# Patient Record
Sex: MALE | HISPANIC OR LATINO | Employment: FULL TIME | URBAN - METROPOLITAN AREA
[De-identification: names, ages, dates, MRNs, and addresses within clinical notes are randomized per-mention and may not be internally consistent; named-entity substitution may affect disease eponyms.]

---

## 2017-01-19 ENCOUNTER — THERAPY VISIT (OUTPATIENT)
Dept: PHYSICAL THERAPY | Facility: CLINIC | Age: 32
End: 2017-01-19
Payer: OTHER MISCELLANEOUS

## 2017-01-19 DIAGNOSIS — M25.572 CHRONIC PAIN OF LEFT ANKLE: Primary | ICD-10-CM

## 2017-01-19 DIAGNOSIS — G89.29 CHRONIC PAIN OF LEFT ANKLE: Primary | ICD-10-CM

## 2017-01-19 PROCEDURE — 97110 THERAPEUTIC EXERCISES: CPT | Mod: GP | Performed by: PHYSICAL THERAPIST

## 2017-01-19 PROCEDURE — 97530 THERAPEUTIC ACTIVITIES: CPT | Mod: GP | Performed by: PHYSICAL THERAPIST

## 2017-01-19 NOTE — PROGRESS NOTES
Subjective:    HPI                    Objective:    System    Physical Exam    General     ROS    Assessment/Plan:      PROGRESS  REPORT    Progress reporting period is from 10-20-16 to 1-19-17.       SUBJECTIVE  Subjective changes noted by patient:  Pt returns one month after his last appt for re-check. Pt reported absence of left ankle pain for the majority of that time, however, noted medial left ankle pain for 2 days this past week. Pt reported waking with the pain. Ankle pain lasted for 2 days, decreasing in intensity over the 2 days. Pt now reports absence of sx's. Pt unaware as to what might have provoked temporary sx's.    Current pain level is: 0/10.     Previous pain level was  0/10 (at last visit on 12-19-16).  Changes in function:  Yes (See Goal flowsheet attached for changes in current functional level)  Adverse reaction to treatment or activity: None    OBJECTIVE  Objective: AROM: DF-5-6 degrees left; 9-10 degrees right. Unable to perform a single leg toe raise on the left without UE support; with light support able to perform 8 reps on the left prior to onset of anterior ankle pain/weakness. Able to perform upwards of 30 eccentric calf raises on the left. Calf circumference-15 3/4 inches on the left (6 inches below joint line) and 15 7/8 inches on the right.     ASSESSMENT/PLAN  Updated problem list and treatment plan: Diagnosis 1:  Left ankle pain  Pain -  manual therapy, self management, education and home program  Decreased ROM/flexibility - manual therapy and therapeutic exercise  Decreased strength - therapeutic exercise and therapeutic activities  Decreased function - therapeutic activities  STG/LTGs have been met or progress has been made towards goals:  Yes (See Goal flow sheet completed today.)  Assessment of Progress: Patient is meeting short term goals and is progressing towards long term goals.  Self Management Plans:  Patient has been instructed in a home treatment program.  I have  re-evaluated this patient and find that the nature, scope, duration and intensity of the therapy is appropriate for the medical condition of the patient.  Adan continues to require the following intervention to meet STG and LTG's:  Pt has completed 8 visits as approved by [employer]. Pt reported he received a call from  asking him to schedule a follow-up with referring provider.    Recommendations:  No further PT is currently planned. Pt will return for follow-up with referring provider.    Please refer to the daily flowsheet for treatment today, total treatment time and time spent performing 1:1 timed codes.

## 2017-04-20 ENCOUNTER — OFFICE VISIT (OUTPATIENT)
Dept: FAMILY MEDICINE | Facility: CLINIC | Age: 32
End: 2017-04-20
Payer: COMMERCIAL

## 2017-04-20 VITALS
DIASTOLIC BLOOD PRESSURE: 83 MMHG | OXYGEN SATURATION: 97 % | BODY MASS INDEX: 32.2 KG/M2 | SYSTOLIC BLOOD PRESSURE: 134 MMHG | WEIGHT: 230 LBS | HEIGHT: 71 IN | HEART RATE: 63 BPM | TEMPERATURE: 98.4 F

## 2017-04-20 DIAGNOSIS — N45.1 EPIDIDYMITIS, RIGHT: Primary | ICD-10-CM

## 2017-04-20 LAB
ALBUMIN UR-MCNC: 30 MG/DL
APPEARANCE UR: CLEAR
BACTERIA #/AREA URNS HPF: ABNORMAL /HPF
BILIRUB UR QL STRIP: NEGATIVE
COLOR UR AUTO: YELLOW
GLUCOSE UR STRIP-MCNC: NEGATIVE MG/DL
HGB UR QL STRIP: NEGATIVE
KETONES UR STRIP-MCNC: ABNORMAL MG/DL
LEUKOCYTE ESTERASE UR QL STRIP: NEGATIVE
NITRATE UR QL: NEGATIVE
PH UR STRIP: 8.5 PH (ref 5–7)
RBC #/AREA URNS AUTO: ABNORMAL /HPF (ref 0–2)
SP GR UR STRIP: 1.02 (ref 1–1.03)
URN SPEC COLLECT METH UR: ABNORMAL
UROBILINOGEN UR STRIP-ACNC: 1 EU/DL (ref 0.2–1)
WBC #/AREA URNS AUTO: ABNORMAL /HPF (ref 0–2)

## 2017-04-20 PROCEDURE — 87491 CHLMYD TRACH DNA AMP PROBE: CPT | Performed by: INTERNAL MEDICINE

## 2017-04-20 PROCEDURE — 81001 URINALYSIS AUTO W/SCOPE: CPT | Performed by: INTERNAL MEDICINE

## 2017-04-20 PROCEDURE — 87591 N.GONORRHOEAE DNA AMP PROB: CPT | Performed by: INTERNAL MEDICINE

## 2017-04-20 PROCEDURE — 87086 URINE CULTURE/COLONY COUNT: CPT | Performed by: INTERNAL MEDICINE

## 2017-04-20 PROCEDURE — 99213 OFFICE O/P EST LOW 20 MIN: CPT | Performed by: INTERNAL MEDICINE

## 2017-04-20 RX ORDER — NAPROXEN 500 MG/1
500 TABLET ORAL 2 TIMES DAILY PRN
Qty: 20 TABLET | Refills: 1 | Status: SHIPPED | OUTPATIENT
Start: 2017-04-20 | End: 2017-07-11

## 2017-04-20 NOTE — NURSING NOTE
"No chief complaint on file.      Initial /83  Pulse 63  Temp 98.4  F (36.9  C) (Oral)  Ht 5' 11\" (1.803 m)  Wt 230 lb (104.3 kg)  SpO2 97%  BMI 32.08 kg/m2 Estimated body mass index is 32.08 kg/(m^2) as calculated from the following:    Height as of this encounter: 5' 11\" (1.803 m).    Weight as of this encounter: 230 lb (104.3 kg).  Medication Reconciliation: complete   Venessa MORFIN CMA      "

## 2017-04-20 NOTE — PROGRESS NOTES
"Adan Adrian is a 31 year old male who presents for atraumatic right testicular pain.  Started around 1700 yest, then worse, now better,.  Left side fine, no lumps or masses, no abdomen pain or n/v, no urine symptoms.  Sexually active with wife only, years.  No prior std or testicular pain.  Is tender but no masses.  Not ill, no f,c,s.    History reviewed. No pertinent past medical history.  History reviewed. No pertinent surgical history.  Social History     Social History     Marital status: Single     Spouse name: N/A     Number of children: N/A     Years of education: N/A     Occupational History     [employer] [address]     Social History Main Topics     Smoking status: Never Smoker     Smokeless tobacco: Never Used     Alcohol use 0.0 oz/week     0 Standard drinks or equivalent per week      Comment: holiday rare      Drug use: No     Sexual activity: No     Other Topics Concern     Not on file     Social History Narrative     Current Outpatient Prescriptions   Medication Sig Dispense Refill     naproxen (NAPROSYN) 500 MG tablet Take 1 tablet (500 mg) by mouth 2 times daily as needed for moderate pain 20 tablet 1     ranitidine (ZANTAC) 150 MG tablet Take 1 tablet (150 mg) by mouth 2 times daily 60 tablet 1     acetaminophen (TYLENOL) 500 MG tablet Take 1 tablet (500 mg) by mouth 3 times daily (Patient taking differently: Take 500 mg by mouth as needed ) 100 tablet 0     Allergies   Allergen Reactions     No Known Drug Allergy      FAMILY HISTORY NOTED AND REVIEWED    REVIEW OF SYSTEMS: above    PHYSICAL EXAM    /83  Pulse 63  Temp 98.4  F (36.9  C) (Oral)  Ht 5' 11\" (1.803 m)  Wt 230 lb (104.3 kg)  SpO2 97%  BMI 32.08 kg/m2    Patient appears non toxic  Abdomen normal active bowel sounds, soft, non-tender, no mgr, no hepatosplenomegaly  Left testicle within normal limits, no masses or tend  Right testicle within normal limits, no masses or tend, he is very tender right epid, no " masses  Penis within normal limits, no masses or lesions    Urine sent    ASSESSMENT:  1. Epididymitis, doubt infectious, std    PLAN:  Heat  Naprosyn 500mg bid, dc if gi upset  Urine studies  If worsens, fever to call, if not gone soon to call    Agusto Vitale M.D.

## 2017-04-20 NOTE — MR AVS SNAPSHOT
After Visit Summary   4/20/2017    Adan Adrian    MRN: [id number]           Patient Information     Date Of Birth          1985        Visit Information        Provider Department      4/20/2017 1:30 PM Agusto Vitale MD [facility]        Today's Diagnoses     Epididymitis, right    -  1      Care Instructions    Use the prescription which should help reduce the inflammation and the pain.  I would also use heat to the area 4x a day for 15 minutes.  Try not to be too active for the next 3 to 5 days.  Your pain should go away over the next few days, if it does not or it worsens or you get ill call right away      Epididymitis  Inflammation of the epididymis can cause pain and swelling in your scrotum. The epididymis is a small tube next to the testicle that stores sperm. Epididymitis is usually caused by an infection. In sexually active men, it is often caused by a sexually transmitted disease (STD) such as chlamydia or gonorrhea. In boys and in men over 40, it can be from bacteria from other parts of the urinary tract (not an STD infection).  Symptoms may begin with pain in the lower belly (abdomen) or low back. The pain then spreads down into the scrotum. Usually only one side is affected. The testicle and scrotum swell and become very painful. You may have fever and a burning when passing urine. Sometimes you may have a discharge from the penis.  Treatment is with antibiotics, and anti-inflammatory and pain medicines. The condition should get better over the first few days of treatment. But it will take several weeks for all the swelling and discomfort to go away. If your healthcare provider suspects that an STD is the cause, your sexual partners must also be treated.  Home care  The following will help you care for yourself at home:    Support the scrotum. When lying down, place a rolled towel under the scrotum. When walking, use an athletic supporter or 2 pairs of  jockey-style underwear.    To relieve pain, put ice packs on the inflamed area. You can make your own ice pack by putting ice cubes in a sealed plastic bag wrapped in a towel.    You may use acetaminophen or ibuprofen to control pain, unless another medicine was prescribed. If you have chronic liver or kidney disease, talk with your healthcare provider before taking these medicines. Also talk with your provider if you've ever had a stomach ulcer or GI bleeding.    Rest in bed for the first few days until the fever, pain, and swelling get better. It may take several weeks for all of the swelling to go away.    Constipation can make you strain. This makes the pain worse. Avoid constipation by eating natural laxatives such as prunes, fresh fruits, and whole-grain cereals. If necessary, use a mild over-the-counter laxative for constipation. Mineral oil can be used to keep the stools soft.    Do not have sex until you have finished all treatment and all symptoms have cleared.    Take all medicine as directed. Do not miss any doses and do not stop early even if you feel better.  Follow-up care  Follow up with your doctor, a urologist, or as advised by our staff to be sure you are responding properly to treatment. If a culture was taken, you may call for the result as directed. A culture test can ensure that you are on the correct antibiotic.   When to seek medical advice  Call your healthcare provider right away if any of these occur:    Fever of more than 100.4 F (38.0 C) after 3 days of treatment    Increasing pain or swelling of the testicle after starting treatment    Pressure or pain in your bladder that gets worse    Unable to pass urine for 8 hours    8162-8205 [employer]. [address] [place] [address], [place] [address]. All rights reserved. This information is not intended as a substitute for professional medical care. Always follow your healthcare professional's instructions.              Follow-ups  "after your visit        Who to contact     If you have questions or need follow up information about today's clinic visit or your schedule please contact [facility] directly at [phone].  Normal or non-critical lab and imaging results will be communicated to you by MyChart, letter or phone within 4 business days after the clinic has received the results. If you do not hear from us within 7 days, please contact the clinic through MyChart or phone. If you have a critical or abnormal lab result, we will notify you by phone as soon as possible.  Submit refill requests through [employer] or call your pharmacy and they will forward the refill request to us. Please allow 3 business days for your refill to be completed.          Additional Information About Your Visit        [employer] Information     [employer] gives you secure access to your electronic health record. If you see a primary care provider, you can also send messages to your care team and make appointments. If you have questions, please call your primary care clinic.  If you do not have a primary care provider, please call [phone] and they will assist you.        Care EveryWhere ID     This is your Care EveryWhere ID. This could be used by other organizations to access your [facility] medical records  [id number]        Your Vitals Were     Pulse Temperature Height Pulse Oximetry BMI (Body Mass Index)       63 98.4  F (36.9  C) (Oral) 5' 11\" (1.803 m) 97% 32.08 kg/m2        Blood Pressure from Last 3 Encounters:   04/20/17 134/83   11/04/16 119/76   10/13/16 128/80    Weight from Last 3 Encounters:   04/20/17 230 lb (104.3 kg)   11/04/16 232 lb (105.2 kg)   10/13/16 231 lb (104.8 kg)              We Performed the Following     *UA reflex to Microscopic     CHLAMYDIA TRACHOMATIS PCR     NEISSERIA GONORRHOEA PCR     Urine Culture Aerobic Bacterial          Today's Medication Changes          These changes are accurate as of: 4/20/17  1:34 PM.  If you " have any questions, ask your nurse or doctor.               These medicines have changed or have updated prescriptions.        Dose/Directions    acetaminophen 500 MG tablet   Commonly known as:  TYLENOL   This may have changed:    - when to take this  - reasons to take this   Used for:  LBP (low back pain)        Dose:  500 mg   Take 1 tablet (500 mg) by mouth 3 times daily   Quantity:  100 tablet   Refills:  0         Stop taking these medicines if you haven't already. Please contact your care team if you have questions.     dexamethasone 4 MG/ML injection   Commonly known as:  DECADRON   Stopped by:  Agusto Vitale MD           neomycin-polymyxin-hydrocortisone [id number] otic suspension   Commonly known as:  CORTISPORIN   Stopped by:  Agusto Vitale MD           order for DME   Stopped by:  Agusto Viatle MD                    Primary Care Provider Office Phone # Fax #    Arriola Praveen Epi Nathan [phone]       [facility] [address]  [facility] [address]        Thank you!     Thank you for choosing [facility]  for your care. Our goal is always to provide you with excellent care. Hearing back from our patients is one way we can continue to improve our services. Please take a few minutes to complete the written survey that you may receive in the mail after your visit with us. Thank you!             Your Updated Medication List - Protect others around you: Learn how to safely use, store and throw away your medicines at www.disposemymeds.org.          This list is accurate as of: 4/20/17  1:34 PM.  Always use your most recent med list.                   Brand Name Dispense Instructions for use    acetaminophen 500 MG tablet    TYLENOL    100 tablet    Take 1 tablet (500 mg) by mouth 3 times daily       ranitidine 150 MG tablet    ZANTAC    60 tablet    Take 1 tablet (150 mg) by mouth 2 times daily

## 2017-04-20 NOTE — PATIENT INSTRUCTIONS
Use the prescription which should help reduce the inflammation and the pain.  I would also use heat to the area 4x a day for 15 minutes.  Try not to be too active for the next 3 to 5 days.  Your pain should go away over the next few days, if it does not or it worsens or you get ill call right away      Epididymitis  Inflammation of the epididymis can cause pain and swelling in your scrotum. The epididymis is a small tube next to the testicle that stores sperm. Epididymitis is usually caused by an infection. In sexually active men, it is often caused by a sexually transmitted disease (STD) such as chlamydia or gonorrhea. In boys and in men over 40, it can be from bacteria from other parts of the urinary tract (not an STD infection).  Symptoms may begin with pain in the lower belly (abdomen) or low back. The pain then spreads down into the scrotum. Usually only one side is affected. The testicle and scrotum swell and become very painful. You may have fever and a burning when passing urine. Sometimes you may have a discharge from the penis.  Treatment is with antibiotics, and anti-inflammatory and pain medicines. The condition should get better over the first few days of treatment. But it will take several weeks for all the swelling and discomfort to go away. If your healthcare provider suspects that an STD is the cause, your sexual partners must also be treated.  Home care  The following will help you care for yourself at home:    Support the scrotum. When lying down, place a rolled towel under the scrotum. When walking, use an athletic supporter or 2 pairs of jockey-style underwear.    To relieve pain, put ice packs on the inflamed area. You can make your own ice pack by putting ice cubes in a sealed plastic bag wrapped in a towel.    You may use acetaminophen or ibuprofen to control pain, unless another medicine was prescribed. If you have chronic liver or kidney disease, talk with your healthcare provider before  taking these medicines. Also talk with your provider if you've ever had a stomach ulcer or GI bleeding.    Rest in bed for the first few days until the fever, pain, and swelling get better. It may take several weeks for all of the swelling to go away.    Constipation can make you strain. This makes the pain worse. Avoid constipation by eating natural laxatives such as prunes, fresh fruits, and whole-grain cereals. If necessary, use a mild over-the-counter laxative for constipation. Mineral oil can be used to keep the stools soft.    Do not have sex until you have finished all treatment and all symptoms have cleared.    Take all medicine as directed. Do not miss any doses and do not stop early even if you feel better.  Follow-up care  Follow up with your doctor, a urologist, or as advised by our staff to be sure you are responding properly to treatment. If a culture was taken, you may call for the result as directed. A culture test can ensure that you are on the correct antibiotic.   When to seek medical advice  Call your healthcare provider right away if any of these occur:    Fever of more than 100.4 F (38.0 C) after 3 days of treatment    Increasing pain or swelling of the testicle after starting treatment    Pressure or pain in your bladder that gets worse    Unable to pass urine for 8 hours    9246-9550 [employer]. [address] [place] [address], [place] [address]. All rights reserved. This information is not intended as a substitute for professional medical care. Always follow your healthcare professional's instructions.

## 2017-04-21 LAB
BACTERIA SPEC CULT: NO GROWTH
C TRACH DNA SPEC QL NAA+PROBE: NORMAL
MICRO REPORT STATUS: NORMAL
N GONORRHOEA DNA SPEC QL NAA+PROBE: NORMAL
SPECIMEN SOURCE: NORMAL

## 2017-04-21 NOTE — PROGRESS NOTES
It was nice meeting you.  Your labs are fine, no signs of any std and no urine infection.  If your symptoms have not gone away soon or worsen please call us.    Agusto Vitale M.D.

## 2017-05-05 ENCOUNTER — OFFICE VISIT (OUTPATIENT)
Dept: PODIATRY | Facility: CLINIC | Age: 32
End: 2017-05-05
Payer: OTHER MISCELLANEOUS

## 2017-05-05 VITALS — HEART RATE: 68 BPM | WEIGHT: 230 LBS | HEIGHT: 71 IN | BODY MASS INDEX: 32.2 KG/M2

## 2017-05-05 DIAGNOSIS — Z02.6 ENCOUNTER RELATED TO WORKER'S COMPENSATION CLAIM: Primary | ICD-10-CM

## 2017-05-05 DIAGNOSIS — M76.829 PTTD (POSTERIOR TIBIAL TENDON DYSFUNCTION): ICD-10-CM

## 2017-05-05 PROCEDURE — 99213 OFFICE O/P EST LOW 20 MIN: CPT | Performed by: PODIATRIST

## 2017-05-05 NOTE — Clinical Note
Good morning  I saw Adan today for follow up on his L ankle work injury.  He has shown some improvement, but not resolution of his symptoms.  An MRI has been ordered and he'll follow up in 2 weeks for a recheck.  Thanks  Prasanna

## 2017-05-05 NOTE — PROGRESS NOTES
"Foot & Ankle Surgery   May 5, 2017    S:  Pt is seen today for evaluation of L ankle work-comp claim.  He was last seen 10/16, thought he had to follow up in 6 months but I had advised a 4 week follow up.  He's noticed improvement, and he's back to work without restrictions, but he continues to have pain along the medial ankle/PT tendon and deltoid ligament, as well as the anterior ankle, with prolonged standing/ambulation.  Works 10-12 hours per day.  He received a pair of orthotics through work-comp, but is wondering if he can get a 2nd pair.  Pes planus bilateral, states he has no problem on the R side and didn't have any problems on the L side prior to the work injury    Vitals:    05/05/17 0855   Pulse: 68   Weight: 230 lb (104.3 kg)   Height: 5' 11\" (1.803 m)   '      ROS - Pos for CC.  Patient denies current nausea, vomiting, chills, fevers, belly pain, calf pain, chest pain or SOB.  Complete remainder of ROS it otherwise neg.      PE:  Gen:   No apparent distress  Neuro:   A&Ox3, no deficits  Psych:    Answering questions appropriately for age and situation with normal affect  Head:    NCAT  Eye:    Visual scanning without deficit  Ear:    Response to auditory stimuli wnl  Lung:    Non-labored breathing on RA noted  Abd:    NTND per patient report  Lymph:    Neg for pitting/non-pitting edema BLE  Vasc:    Pulses palpable, CFT minimally delayed  Neuro:    Light touch sensation intact to all sensory nerve distributions without paresthesias  Derm:    Neg for nodules, lesions or ulcerations  MSK:    Pes planus bilateral.  Left lower extremity shows pain at medial ankle, along course of PT tendon and at deltoid ligament.  Anterior ankle pain noted.  No sinus tarsi pain today  Calf:    Neg for redness, swelling or tenderness    Assessment:  31 year old male with work-comp L ankle injury      Plan:  Discussed etiologies/options  1.  Work-comp left ankle injury  -MRI ordered @ [facility]; advised he discuss this with " [facility] prior to scheduling  -continue with RICE/NSAID and compression prn  -advised he discuss with [facility] his request for a 2nd pair of orthotics.  If covered, call clinic and we'll order custom orthotics for work-comp injury, PTTD and pes planus    Follow up:  2 weeks to review MRI      Body mass index is 32.08 kg/(m^2).  Weight management plan: Patient was referred to their PCP to discuss a diet and exercise plan.         Prasanna Hinson DPM   Podiatric Foot & Ankle Surgeon  [facility]  [phone]

## 2017-05-05 NOTE — MR AVS SNAPSHOT
After Visit Summary   5/5/2017    Adan Adrian    MRN: [id number]           Patient Information     Date Of Birth          1985        Visit Information        Provider Department      5/5/2017 9:00 AM Prasanna Hinson DPM [facility]        Today's Diagnoses     Encounter related to worker's compensation claim    -  1    PTTD (posterior tibial tendon dysfunction)           Follow-ups after your visit        Your next 10 appointments already scheduled     May 18, 2017 11:15 AM CDT   [facility]    [address] [id number] [phone]              Future tests that were ordered for you today     Open Future Orders        Priority Expected Expires Ordered    MR Ankle Left w/o Contrast Routine  5/5/2018 5/5/2017            Who to contact     If you have questions or need follow up information about today's clinic visit or your schedule please contact [facility] directly at [phone].  Normal or non-critical lab and imaging results will be communicated to you by [employer], letter or phone within 4 business days after the clinic has received the results. If you do not hear from us within 7 days, please contact the clinic through Yeapoot or phone. If you have a critical or abnormal lab result, we will notify you by phone as soon as possible.  Submit refill requests through [employer] or call your pharmacy and they will forward the refill request to us. Please allow 3 business days for your refill to be completed.          Additional Information About Your Visit        ISE [employer]hart Information     [employer] gives you secure access to your electronic health record. If you see a primary care provider, you can also send messages to your care team and make appointments. If you have questions, please call your primary care clinic.  If you do not have a primary care provider, please  "call [phone] and they will assist you.        Care EveryWhere ID     This is your Care EveryWhere ID. This could be used by other organizations to access your [place] medical records  [id number]        Your Vitals Were     Pulse Height BMI (Body Mass Index)             68 5' 11\" (1.803 m) 32.08 kg/m2          Blood Pressure from Last 3 Encounters:   04/20/17 134/83   11/04/16 119/76   10/13/16 128/80    Weight from Last 3 Encounters:   05/05/17 230 lb (104.3 kg)   04/20/17 230 lb (104.3 kg)   11/04/16 232 lb (105.2 kg)                 Today's Medication Changes          These changes are accurate as of: 5/5/17  9:14 AM.  If you have any questions, ask your nurse or doctor.               These medicines have changed or have updated prescriptions.        Dose/Directions    acetaminophen 500 MG tablet   Commonly known as:  TYLENOL   This may have changed:    - when to take this  - reasons to take this   Used for:  LBP (low back pain)        Dose:  500 mg   Take 1 tablet (500 mg) by mouth 3 times daily   Quantity:  100 tablet   Refills:  0                Primary Care Provider Office Phone # Fax #    Arriola Praveen Nathan [phone]       [facility] [address] [address]        Thank you!     Thank you for choosing [facility]  for your care. Our goal is always to provide you with excellent care. Hearing back from our patients is one way we can continue to improve our services. Please take a few minutes to complete the written survey that you may receive in the mail after your visit with us. Thank you!             Your Updated Medication List - Protect others around you: Learn how to safely use, store and throw away your medicines at www.disposemymeds.org.          This list is accurate as of: 5/5/17  9:14 AM.  Always use your most recent med list.                   Brand Name Dispense Instructions for use    acetaminophen 500 MG " tablet    TYLENOL    100 tablet    Take 1 tablet (500 mg) by mouth 3 times daily       naproxen 500 MG tablet    NAPROSYN    20 tablet    Take 1 tablet (500 mg) by mouth 2 times daily as needed for moderate pain       ranitidine 150 MG tablet    ZANTAC    60 tablet    Take 1 tablet (150 mg) by mouth 2 times daily

## 2017-05-05 NOTE — NURSING NOTE
"Chief Complaint   Patient presents with     RECHECK     L ankle WC injury f/u , last seen in 10/2016, says his WC insurance needs him to f/u        Initial Pulse 68  Ht 5' 11\" (1.803 m)  Wt 230 lb (104.3 kg)  BMI 32.08 kg/m2 Estimated body mass index is 32.08 kg/(m^2) as calculated from the following:    Height as of this encounter: 5' 11\" (1.803 m).    Weight as of this encounter: 230 lb (104.3 kg).  Medication Reconciliation: complete  "

## 2017-05-12 ENCOUNTER — HOSPITAL ENCOUNTER (OUTPATIENT)
Dept: MRI IMAGING | Facility: CLINIC | Age: 32
Discharge: HOME | End: 2017-05-12
Attending: PODIATRIST | Admitting: PODIATRIST
Payer: OTHER MISCELLANEOUS

## 2017-05-12 DIAGNOSIS — M76.829 PTTD (POSTERIOR TIBIAL TENDON DYSFUNCTION): ICD-10-CM

## 2017-05-12 DIAGNOSIS — Z02.6 ENCOUNTER RELATED TO WORKER'S COMPENSATION CLAIM: ICD-10-CM

## 2017-05-12 PROCEDURE — 73721 MRI JNT OF LWR EXTRE W/O DYE: CPT | Mod: LT

## 2017-05-18 ENCOUNTER — OFFICE VISIT (OUTPATIENT)
Dept: PODIATRY | Facility: CLINIC | Age: 32
End: 2017-05-18
Payer: OTHER MISCELLANEOUS

## 2017-05-18 VITALS
WEIGHT: 230 LBS | SYSTOLIC BLOOD PRESSURE: 122 MMHG | HEIGHT: 71 IN | DIASTOLIC BLOOD PRESSURE: 76 MMHG | HEART RATE: 68 BPM | BODY MASS INDEX: 32.2 KG/M2

## 2017-05-18 DIAGNOSIS — M21.42 PES PLANUS OF LEFT FOOT: ICD-10-CM

## 2017-05-18 DIAGNOSIS — Z02.6 ENCOUNTER RELATED TO WORKER'S COMPENSATION CLAIM: Primary | ICD-10-CM

## 2017-05-18 DIAGNOSIS — S93.422S SPRAIN OF DELTOID LIGAMENT OF LEFT ANKLE, SEQUELA: ICD-10-CM

## 2017-05-18 PROCEDURE — 99213 OFFICE O/P EST LOW 20 MIN: CPT | Performed by: PODIATRIST

## 2017-05-18 NOTE — MR AVS SNAPSHOT
After Visit Summary   5/18/2017    Adan Adrian    MRN: [id number]           Patient Information     Date Of Birth          1985        Visit Information        Provider Department      5/18/2017 11:15 AM Prasanna Hinson DPM [facility]        Today's Diagnoses     Encounter related to worker's compensation claim    -  1    Pes planus of left foot        Sprain of deltoid ligament of left ankle, sequela           Follow-ups after your visit        Additional Services     ORTHOTICS REFERRAL       Please be aware that coverage of these services is subject to the terms and limitations of your health insurance plan.  Call member services at your health plan with any benefit or coverage questions.      Please bring the following to your appointment:    >>   Any x-rays, CTs or MRIs which have been performed.  Contact the facility where they were done to arrange for  prior to your scheduled appointment.  Any new CT, MRI or other procedures ordered by your specialist must be performed at a [facility] facility or coordinated by your clinic's referral office.    >>   List of current medications   >>   This referral request   >>   Any documents/labs given to you for this referral    ==This Referral PRINTS in the [facility] (ORTHOTICS & PROSTHETICS) Central scheduling office ==     The [facility] Central Scheduling staff will contact patient to arrange appointments. Central Scheduling Phone #:  [place], [place]  [phone]     Orthotics: Foot Orthotics - custom functional orthotics for pes planus bilateral and left lower extremity deltoid ligament/PT tendon pain.                  Who to contact     If you have questions or need follow up information about today's clinic visit or your schedule please contact [facility] directly at [phone].  Normal or non-critical lab and imaging results will be communicated to you by MyChart, letter or phone within 4  "business days after the clinic has received the results. If you do not hear from us within 7 days, please contact the clinic through Xipin or phone. If you have a critical or abnormal lab result, we will notify you by phone as soon as possible.  Submit refill requests through [employer]in or call your pharmacy and they will forward the refill request to us. Please allow 3 business days for your refill to be completed.          Additional Information About Your Visit        [employer]in Information     [employer] gives you secure access to your electronic health record. If you see a primary care provider, you can also send messages to your care team and make appointments. If you have questions, please call your primary care clinic.  If you do not have a primary care provider, please call [phone] and they will assist you.        Care EveryWhere ID     This is your Care EveryWhere ID. This could be used by other organizations to access your [place] medical records  [id number]        Your Vitals Were     Pulse Height BMI (Body Mass Index)             68 5' 11\" (1.803 m) 32.08 kg/m2          Blood Pressure from Last 3 Encounters:   05/18/17 122/76   04/20/17 134/83   11/04/16 119/76    Weight from Last 3 Encounters:   05/18/17 230 lb (104.3 kg)   05/05/17 230 lb (104.3 kg)   04/20/17 230 lb (104.3 kg)              We Performed the Following     ORTHOTICS REFERRAL        Primary Care Provider Office Phone # Fax #    Arriola Praveen Nathan [phone]       [facility] [address] [address]        Thank you!     Thank you for choosing [facility]  for your care. Our goal is always to provide you with excellent care. Hearing back from our patients is one way we can continue to improve our services. Please take a few minutes to complete the written survey that you may receive in the mail after your visit with us. Thank you!             Your Updated " Medication List - Protect others around you: Learn how to safely use, store and throw away your medicines at www.disposemymeds.org.          This list is accurate as of: 5/18/17  1:29 PM.  Always use your most recent med list.                   Brand Name Dispense Instructions for use    acetaminophen 500 MG tablet    TYLENOL    100 tablet    Take 1 tablet (500 mg) by mouth 3 times daily       naproxen 500 MG tablet    NAPROSYN    20 tablet    Take 1 tablet (500 mg) by mouth 2 times daily as needed for moderate pain       ranitidine 150 MG tablet    ZANTAC    60 tablet    Take 1 tablet (150 mg) by mouth 2 times daily

## 2017-05-18 NOTE — PROGRESS NOTES
"Foot & Ankle Surgery   May 18, 2017    S:  Pt is seen today for evaluation of L ankle MRI results.  He thinks he's doing well with his work-comp injury.  It still can ache at the end of the day.  He ices it and it does well, intermittent NSAID use as well.  He has been given the OK to get the new orthotics    Vitals:    05/18/17 1117   BP: 122/76   BP Location: Left arm   Patient Position: Chair   Cuff Size: Adult Large   Pulse: 68   Weight: 230 lb (104.3 kg)   Height: 5' 11\" (1.803 m)   '      ROS - Pos for CC.  Patient denies current nausea, vomiting, chills, fevers, belly pain, calf pain, chest pain or SOB.  Complete remainder of ROS it otherwise neg.      PE:  Gen:   No apparent distress  Neuro:   A&Ox3, no deficits  Psych:    Answering questions appropriately for age and situation with normal affect  Head:    NCAT  Eye:    Visual scanning without deficit  Ear:    Response to auditory stimuli wnl  Lung:    Non-labored breathing on RA noted  Abd:    NTND per patient report  Ankle not examined today    Imaging:    Recent Results (from the past 744 hour(s))   MR Ankle Left w/o Contrast    Narrative    MR LEFT ANKLE WITHOUT CONTRAST  5/12/2017  3:03 PM    HISTORY: Medial left ankle pain following an injury. Evaluate for  deltoid ligament or posterior tibial tendon pathology.    COMPARISON: None.    TECHNIQUE: Sagittal and coronal T1 and STIR. Transverse proton density  and T2.    FINDINGS:    Osseous and Cartilaginous Structures: No fracture or osseous lesion is  demonstrated. No abnormal marrow signal intensity is identified. The  cartilage surfaces are well preserved. No talar dome osteochondral  lesion.    Posterior Tibial and Flexor Tendons: No tear or significant tendinosis  of the posterior tibial tendon, flexor digitorum longus tendon, or  flexor hallucis longus tendon.     Peroneal Tendons: No tear or significant tendinosis of the peroneal  brevis tendon or peroneal longus tendon.     Achilles Tendon: No " tear or significant tendinosis.     Extensor Tendons: No tear or significant tendinosis of the anterior  tibial tendon, extensor hallucis longus tendon, or extensor digitorum  longus tendon.     Lateral Ligaments: The anterior talofibular ligament is unremarkable.  The calcaneofibular, posterior talofibular, and anterior tibiofibular  ligaments are unremarkable.     Medial Deltoid Ligamentous Complex: Unremarkable.     Plantar Fascia: Unremarkable, with no findings to suggest active  plantar fasciitis.     Additional Findings: No significant tibiotalar joint effusion. No  retrocalcaneal bursitis. No mass within the tarsal tunnel. The sinus  tarsi is unremarkable. No soft tissue abnormality is seen.      Impression    IMPRESSION: Unremarkable MRI of the left ankle. Specifically, no  deltoid ligament or posterior tibial tendon pathology is seen.    CHRISTOPHER MADSEN MD       Assessment:  31 year old male with work-comp L ankle injury; no pathology on MRI      Plan:  Discussed etiologies/options  1.  L ankle work-comp injury  -personally reviewed imaging  -RICE/NSAID prn  -Orthotics ordered  -he thinks he's doing well.  I think that, based on his improvement, and the fact that his MRI is neg, it's ok to close the work-comp claim.    Follow up:  prn      Body mass index is 32.08 kg/(m^2).  Weight management plan: Patient was referred to their PCP to discuss a diet and exercise plan.         Prasanna Hinson DPM   Podiatric Foot & Ankle Surgeon  [facility]  [phone]

## 2017-05-18 NOTE — NURSING NOTE
"Chief Complaint   Patient presents with     Results     Left ankle, MRI results       Initial /76 (BP Location: Left arm, Patient Position: Chair, Cuff Size: Adult Large)  Pulse 68  Ht 5' 11\" (1.803 m)  Wt 230 lb (104.3 kg)  BMI 32.08 kg/m2 Estimated body mass index is 32.08 kg/(m^2) as calculated from the following:    Height as of this encounter: 5' 11\" (1.803 m).    Weight as of this encounter: 230 lb (104.3 kg).  Medication Reconciliation: complete    "

## 2017-05-18 NOTE — Clinical Note
Good afternoon  I saw Adan this morning for follow up on MRI results for his work-comp L ankle injury.  MRI neg, Adan is doing well.  Orthotics were ordered, and he'll follow up prn.  Thanks  Prasanna

## 2017-07-10 ENCOUNTER — TELEPHONE (OUTPATIENT)
Dept: FAMILY MEDICINE | Facility: CLINIC | Age: 32
End: 2017-07-10

## 2017-07-10 NOTE — TELEPHONE ENCOUNTER
TC:  This is not a urgent symptom that needs appt today.  Please offer TEAM appt tomorrow or this week.  Patient can be seen in UC for this too.  Thank you.  Jill Clark RN

## 2017-07-10 NOTE — TELEPHONE ENCOUNTER
Reason for call:  Patient reporting a symptom    Symptom or request: rash on hands and getting between fingers    Duration (how long have symptoms been present): 10 days    Have you been treated for this before? No  He has tried otc hydrocortisone, not helping    Additional comments: I could not find any appointments today    Phone Number patient can be reached at:  Cell number on file:    Telephone Information:   Mobile [phone]       Best Time:      Can we leave a detailed message on this number:  YES    Call taken on 7/10/2017 at 9:27 AM by Lis Olivarez

## 2017-07-11 ENCOUNTER — OFFICE VISIT (OUTPATIENT)
Dept: FAMILY MEDICINE | Facility: CLINIC | Age: 32
End: 2017-07-11
Payer: COMMERCIAL

## 2017-07-11 VITALS
DIASTOLIC BLOOD PRESSURE: 80 MMHG | WEIGHT: 237 LBS | HEIGHT: 71 IN | TEMPERATURE: 97.2 F | BODY MASS INDEX: 33.18 KG/M2 | HEART RATE: 61 BPM | OXYGEN SATURATION: 96 % | SYSTOLIC BLOOD PRESSURE: 124 MMHG

## 2017-07-11 DIAGNOSIS — L28.2 PRURITIC RASH: Primary | ICD-10-CM

## 2017-07-11 PROCEDURE — 99213 OFFICE O/P EST LOW 20 MIN: CPT | Performed by: NURSE PRACTITIONER

## 2017-07-11 RX ORDER — HYDROCORTISONE LOTION 20 MG/ML
LOTION TOPICAL 2 TIMES DAILY
Qty: 1 BOTTLE | Refills: 0 | Status: SHIPPED | OUTPATIENT
Start: 2017-07-11 | End: 2018-02-19

## 2017-07-11 NOTE — PROGRESS NOTES
"HPI      SUBJECTIVE:                                                    Adan Adrian is a 32 year old male who presents to clinic today for the following health issues:  Chief Complaint   Patient presents with     Derm Problem     rash on hands , blisters web of fingers 2 weeks, itches. No new soaps or changes at job.  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  No past surgical history on file.  Social History   Substance Use Topics     Smoking status: Never Smoker     Smokeless tobacco: Never Used     Alcohol use 0.0 oz/week     0 Standard drinks or equivalent per week      Comment: maybe every 2 weeks or a party      No current outpatient prescriptions on file.     Allergies   Allergen Reactions     No Known Drug Allergy        Reviewed and updated as needed this visit by clinical staff and provider    ROS  Detailed as above       /80 (BP Location: Right arm, Patient Position: Chair, Cuff Size: Adult Large)  Pulse 61  Temp 97.2  F (36.2  C) (Oral)  Ht 5' 11\" (1.803 m)  Wt 237 lb (107.5 kg)  SpO2 96%  BMI 33.05 kg/m2      Physical Exam   Constitutional: He is well-developed, well-nourished, and in no distress.   Pulmonary/Chest: Effort normal.   Neurological: He is alert.   Skin: Skin is warm and dry.   Very faint tiny papules surrounded by dry peeling skin to left thenar eminence and between right 3rd and 4th fingers    Psychiatric: Mood and affect normal.   Vitals reviewed.      Assessment and Plan:       ICD-10-CM    1. Pruritic rash L28.2 hydrocortisone 2 % LOTN       Will trial stronger steroid cream   Can take claritin or zyrtec prn itching   Call with no improvement and will send to gemini Trinidad, APRN, CNP  [facility]    "

## 2017-07-11 NOTE — MR AVS SNAPSHOT
"              After Visit Summary   7/11/2017    Adan Adrian    MRN: [id number]           Patient Information     Date Of Birth          1985        Visit Information        Provider Department      7/11/2017 10:00 AM Tom Trinidad APRN CNP [facility]        Today's Diagnoses     Pruritic rash    -  1       Follow-ups after your visit        Who to contact     If you have questions or need follow up information about today's clinic visit or your schedule please contact [facility] directly at [phone].  Normal or non-critical lab and imaging results will be communicated to you by Pen[employer] K [employer]hart, letter or phone within 4 business days after the clinic has received the results. If you do not hear from us within 7 days, please contact the clinic through Pen[employer] K [employer] or phone. If you have a critical or abnormal lab result, we will notify you by phone as soon as possible.  Submit refill requests through [employer] or call your pharmacy and they will forward the refill request to us. Please allow 3 business days for your refill to be completed.          Additional Information About Your Visit        MyChart Information     [employer] gives you secure access to your electronic health record. If you see a primary care provider, you can also send messages to your care team and make appointments. If you have questions, please call your primary care clinic.  If you do not have a primary care provider, please call [phone] and they will assist you.        Care EveryWhere ID     This is your Care EveryWhere ID. This could be used by other organizations to access your [place] medical records  [id number]        Your Vitals Were     Pulse Temperature Height Pulse Oximetry BMI (Body Mass Index)       61 97.2  F (36.2  C) (Oral) 5' 11\" (1.803 m) 96% 33.05 kg/m2        Blood Pressure from Last 3 Encounters:   07/11/17 124/80   05/18/17 122/76   04/20/17 134/83    Weight from Last 3 Encounters:   07/11/17 " 237 lb (107.5 kg)   05/18/17 230 lb (104.3 kg)   05/05/17 230 lb (104.3 kg)              Today, you had the following     No orders found for display         Today's Medication Changes          These changes are accurate as of: 7/11/17  1:46 PM.  If you have any questions, ask your nurse or doctor.               Start taking these medicines.        Dose/Directions    hydrocortisone 2 % Lotn   Used for:  Pruritic rash   Started by:  Tom Trinidad APRN CNP        Externally apply topically 2 times daily   Quantity:  1 Bottle   Refills:  0            Where to get your medicines      These medications were sent to [facility] - [place], [place] - [address]  [address], [facility] [address]     Phone:  [phone]     hydrocortisone 2 % Lotn                Primary Care Provider Office Phone # Fax #    Flako Morton Epi Nathan [phone]       [facility] [address]  [facility] [address]        Equal Access to Services     : Hadii aad ku hadasho Soomaali, waaxda luqadaha, qaybta kaalmada adeegyada, waxay isabelin haymina stein . So [facility] [phone].    ATENCIÓN: Si habla español, tiene a frias disposición servicios gratuitos de asistencia lingüística. Llame al [phone].    We comply with applicable federal civil rights laws and [place] laws. We do not discriminate on the basis of race, color, national origin, age, disability sex, sexual orientation or gender identity.            Thank you!     Thank you for choosing [facility]  for your care. Our goal is always to provide you with excellent care. Hearing back from our patients is one way we can continue to improve our services. Please take a few minutes to complete the written survey that you may receive in the mail after your visit with us. Thank you!             Your Updated Medication List - Protect others around you: Learn how  to safely use, store and throw away your medicines at www.disposemymeds.org.          This list is accurate as of: 7/11/17  1:46 PM.  Always use your most recent med list.                   Brand Name Dispense Instructions for use Diagnosis    hydrocortisone 2 % Lotn     1 Bottle    Externally apply topically 2 times daily    Pruritic rash

## 2017-07-11 NOTE — NURSING NOTE
"Chief Complaint   Patient presents with     Derm Problem     rash on hands , blisters web of fingers 2 weeks, itches. No new soaps or changes at job.  Tried hydrocortisone        Initial /80 (BP Location: Right arm, Patient Position: Chair, Cuff Size: Adult Large)  Pulse 61  Temp 97.2  F (36.2  C) (Oral)  Ht 5' 11\" (1.803 m)  Wt 237 lb (107.5 kg)  SpO2 96%  BMI 33.05 kg/m2 Estimated body mass index is 33.05 kg/(m^2) as calculated from the following:    Height as of this encounter: 5' 11\" (1.803 m).    Weight as of this encounter: 237 lb (107.5 kg).  Medication Reconciliation: complete  "

## 2017-08-01 ENCOUNTER — TELEPHONE (OUTPATIENT)
Dept: FAMILY MEDICINE | Facility: CLINIC | Age: 32
End: 2017-08-01

## 2017-08-01 DIAGNOSIS — L28.2 PRURITIC RASH: ICD-10-CM

## 2017-08-01 DIAGNOSIS — Z20.7 SCABIES EXPOSURE: Primary | ICD-10-CM

## 2017-08-01 NOTE — TELEPHONE ENCOUNTER
Reason for Call:  Scabies    Detailed comments: Patient was seen by Holger for a Rash on his hands  His Son also had the same thing and was brought to the Pediatrician and was  DX with Scabies patient said the Pediatrician told him to call and get one the same  RX as his son to treat for Scabies  Permethrin 5% Cream        [facility] [address] [place] [address] [address] AT [facility] [address]      Phone Number Patient can be reached at: Home number on file [phone] (home)    Best Time: anytime    Can we leave a detailed message on this number? YES    Call taken on 8/1/2017 at 4:04 PM by Jerod Jones

## 2017-08-02 RX ORDER — PERMETHRIN 50 MG/G
CREAM TOPICAL
Qty: 60 G | Refills: 1 | Status: SHIPPED | OUTPATIENT
Start: 2017-08-02 | End: 2018-02-19

## 2017-08-02 RX ORDER — HYDROCORTISONE LOTION 20 MG/ML
LOTION TOPICAL 2 TIMES DAILY
Qty: 1 BOTTLE | Refills: 0 | Status: CANCELLED | OUTPATIENT
Start: 2017-08-02

## 2017-08-02 NOTE — TELEPHONE ENCOUNTER
The permethrin that I just sent in will treat the condition. He should take Claritin or zyrtec pill once daily as this should help with his itching   Thanks

## 2017-08-02 NOTE — TELEPHONE ENCOUNTER
Spoke with patient. Asking for refill on Hydrocortisone as it helped with itching-Has spread to legs    hydrocortisone 2 % LOTN      Last Written Prescription Date: 7/11/2017  Last Fill Quantity: 60g,  # refills: 1   Last Office Visit with [facility], [facility] or [facility] prescribing provider: 7/11/2017

## 2017-10-26 ENCOUNTER — OFFICE VISIT (OUTPATIENT)
Dept: PODIATRY | Facility: CLINIC | Age: 32
End: 2017-10-26
Payer: OTHER MISCELLANEOUS

## 2017-10-26 VITALS
DIASTOLIC BLOOD PRESSURE: 84 MMHG | HEART RATE: 58 BPM | HEIGHT: 72 IN | WEIGHT: 232 LBS | SYSTOLIC BLOOD PRESSURE: 134 MMHG | BODY MASS INDEX: 31.42 KG/M2

## 2017-10-26 DIAGNOSIS — M25.572 SINUS TARSI SYNDROME OF LEFT FOOT: ICD-10-CM

## 2017-10-26 DIAGNOSIS — M76.829 PTTD (POSTERIOR TIBIAL TENDON DYSFUNCTION): ICD-10-CM

## 2017-10-26 DIAGNOSIS — M21.42 PES PLANUS OF LEFT FOOT: Primary | ICD-10-CM

## 2017-10-26 PROCEDURE — 99213 OFFICE O/P EST LOW 20 MIN: CPT | Performed by: PODIATRIST

## 2017-10-26 NOTE — MR AVS SNAPSHOT
After Visit Summary   10/26/2017    Adan Adrian    MRN: [id number]           Patient Information     Date Of Birth          1985        Visit Information        Provider Department      10/26/2017 8:30 AM Prasanna Hinson DPM [facility]        Care Instructions      DR. HINSON'S CLINIC LOCATIONS:   MONDAY - [place] TUESDAY - [place]   [address] [facility]  [address]   [place] [address] [place] [address]   [phone]       THURSDAY AM - [place] THURSDAY PM - [place]   [address]   [place] [address] [place], [place] [address] [phone]       FRIDAY AM - [place] SET UP SURGERY: [phone]   [address] APPOINTMENTS: [phone]   [place] [address] BILLING QUESTIONS: [phone] FAX NUMBER: [phone]     Follow Up: in 3 weeks    PRICE Therapy    Many aches and pains throughout the foot and ankle can be helped with many simple treatments.  This is usually described as PRICE Therapy.      P - Protection - often times, inflammation/pain in the lower extremity is not able to improve simply because the areas involved are never allowed to rest.  Every step we take can bother the problematic area.  Protecting those areas is an important step in the healing process.  This may involve a walking cast boot, a special insert/orthotic device, an ankle brace, or simply avoiding barefoot walking.    R - Rest - in addition to protecting the foot/ankle, resting is an important, but often times difficult, treatment option.  Getting off your feet when they bother you, and specifically avoiding activities that cause pain/discomfort, are very beneficial to prevent, and treat, foot/ankle pain.      I - Ice - icing regularly can help to decrease inflammation and swelling in the foot, thus decreasing pain.  Using an ice pack or a bag of frozen peas works very well.  Ice for 20 minutes  multiple times per day as needed.  Do not place the ice directly on the skin as this can cause tissue damage.    C - Compression - using a compression wrap or an ACE wrap can help to decrease swelling, which can help to decrease pain.  Wearing the wraps is generally not needed at night, but they should be worn on a regular basis when you are going to be on your feet for prolonged periods as gravity tends to pull fluids down to your feet/ankles.    E - Elevation - elevating your lower extremities multiple times daily for 15-20 minutes can help to decrease swelling, which works well in decreasing pain levels.      NSAID/Tylenol - An anti-inflammatory, like Aleve or ibuprofen, and/or a pain medication, such as Tylenol, can help to improve pain levels and get the issue resolved sooner rather than later.  Anyone with liver issues should be careful with Tylenol, and anyone with high blood pressure or heart, stomach or kidney issues should be careful with anti-inflammatories.  Please ask if you have questions about these medications, including dosage.        Body Mass Index (BMI)  Many things can cause foot and ankle problems. Foot structure, activity level, foot mechanics and injuries are common causes of pain. One very important issue that often goes unmentioned, is body weight. Extra weight can cause increased stress on muscles, ligaments, bones and tendons. Sometimes just a few extra pounds is all it takes to put one over her/his threshold. Without reducing that stress, it can be difficult to alleviate pain. Some people are uncomfortable addressing this issue, but we feel it is important for you to think about it. As Foot &  Ankle specialists, our job is addressing the lower extremity problem and possible causes. Regarding extra body weight, we encourage patients to discuss diet and weight management plans with their primary care doctors. It is this team approach that gives you the best opportunity for pain relief and  getting you back on your feet.            Follow-ups after your visit        Who to contact     If you have questions or need follow up information about today's clinic visit or your schedule please contact [facility] directly at [phone].  Normal or non-critical lab and imaging results will be communicated to you by MyChart, letter or phone within 4 business days after the clinic has received the results. If you do not hear from us within 7 days, please contact the clinic through FundedByMehart or phone. If you have a critical or abnormal lab result, we will notify you by phone as soon as possible.  Submit refill requests through mobilePeople or call your pharmacy and they will forward the refill request to us. Please allow 3 business days for your refill to be completed.          Additional Information About Your Visit        FundedByMehart Information     mobilePeople gives you secure access to your electronic health record. If you see a primary care provider, you can also send messages to your care team and make appointments. If you have questions, please call your primary care clinic.  If you do not have a primary care provider, please call [phone] and they will assist you.        Care EveryWhere ID     This is your Care EveryWhere ID. This could be used by other organizations to access your [place] medical records  [id number]        Your Vitals Were     Pulse Height BMI (Body Mass Index)             58 6' (1.829 m) 31.46 kg/m2          Blood Pressure from Last 3 Encounters:   10/26/17 134/84   07/11/17 124/80   05/18/17 122/76    Weight from Last 3 Encounters:   10/26/17 232 lb (105.2 kg)   07/11/17 237 lb (107.5 kg)   05/18/17 230 lb (104.3 kg)              Today, you had the following     No orders found for display       Primary Care Provider Office Phone # Fax #    Arriola Praveen Nathan [phone] [address]  [place] [address]        Equal Access to Services      PAL [place] : Hadii aad ku arnulfo Song, waaxda luqadaha, qaybta kaalayaka gabriellavaibhavradha, kai luisa roblessukhirizwana patterson. So [facility] [phone].    ATENCIÓN: Si habla español, tiene a frias disposición servicios gratuitos de asistencia lingüística. Llame al [phone].    We comply with applicable federal civil rights laws and [place] laws. We do not discriminate on the basis of race, color, national origin, age, disability, sex, sexual orientation, or gender identity.            Thank you!     Thank you for choosing [facility]  for your care. Our goal is always to provide you with excellent care. Hearing back from our patients is one way we can continue to improve our services. Please take a few minutes to complete the written survey that you may receive in the mail after your visit with us. Thank you!             Your Updated Medication List - Protect others around you: Learn how to safely use, store and throw away your medicines at www.disposemymeds.org.          This list is accurate as of: 10/26/17  8:44 AM.  Always use your most recent med list.                   Brand Name Dispense Instructions for use Diagnosis    hydrocortisone 2 % Lotn     1 Bottle    Externally apply topically 2 times daily    Pruritic rash       permethrin 5 % cream    ELIMITE    60 g    Apply cream from head to toe (except the face); leave on for 8-14 hours then wash off with water; reapply in 1 week if live mites appear.    Scabies exposure

## 2017-10-26 NOTE — PATIENT INSTRUCTIONS
DR. ALVAREZ'S CLINIC LOCATIONS:   MONDAY - [place]  TUESDAY - [place]   [address] [facility]  [address]   [place] [address] [place] [address]   [phone]       THURSDAY AM - [place] [facility]   [address]   [place] [address] [place], [place] [address] [phone]       FRIDAY AM - [place] SET UP SURGERY: [phone] [address] APPOINTMENTS: [phone]   [place] [address] BILLING QUESTIONS: [phone] FAX NUMBER: [phone]     Follow Up: in 3 weeks    PRICE Therapy    Many aches and pains throughout the foot and ankle can be helped with many simple treatments.  This is usually described as PRICE Therapy.      P - Protection - often times, inflammation/pain in the lower extremity is not able to improve simply because the areas involved are never allowed to rest.  Every step we take can bother the problematic area.  Protecting those areas is an important step in the healing process.  This may involve a walking cast boot, a special insert/orthotic device, an ankle brace, or simply avoiding barefoot walking.    R - Rest - in addition to protecting the foot/ankle, resting is an important, but often times difficult, treatment option.  Getting off your feet when they bother you, and specifically avoiding activities that cause pain/discomfort, are very beneficial to prevent, and treat, foot/ankle pain.      I - Ice - icing regularly can help to decrease inflammation and swelling in the foot, thus decreasing pain.  Using an ice pack or a bag of frozen peas works very well.  Ice for 20 minutes multiple times per day as needed.  Do not place the ice directly on the skin as this can cause tissue damage.    C - Compression - using a compression wrap or an ACE wrap can help to decrease swelling, which can help to decrease pain.  Wearing the wraps is generally not needed at night, but they should  be worn on a regular basis when you are going to be on your feet for prolonged periods as gravity tends to pull fluids down to your feet/ankles.    E - Elevation - elevating your lower extremities multiple times daily for 15-20 minutes can help to decrease swelling, which works well in decreasing pain levels.      NSAID/Tylenol - An anti-inflammatory, like Aleve or ibuprofen, and/or a pain medication, such as Tylenol, can help to improve pain levels and get the issue resolved sooner rather than later.  Anyone with liver issues should be careful with Tylenol, and anyone with high blood pressure or heart, stomach or kidney issues should be careful with anti-inflammatories.  Please ask if you have questions about these medications, including dosage.        Body Mass Index (BMI)  Many things can cause foot and ankle problems. Foot structure, activity level, foot mechanics and injuries are common causes of pain. One very important issue that often goes unmentioned, is body weight. Extra weight can cause increased stress on muscles, ligaments, bones and tendons. Sometimes just a few extra pounds is all it takes to put one over her/his threshold. Without reducing that stress, it can be difficult to alleviate pain. Some people are uncomfortable addressing this issue, but we feel it is important for you to think about it. As Foot &  Ankle specialists, our job is addressing the lower extremity problem and possible causes. Regarding extra body weight, we encourage patients to discuss diet and weight management plans with their primary care doctors. It is this team approach that gives you the best opportunity for pain relief and getting you back on your feet.

## 2017-10-26 NOTE — PROGRESS NOTES
Foot & Ankle Surgery   October 26, 2017    S:  Pt is seen today for evaluation of left lower extremity work-comp injury.  I saw him 10/16 and did CAM, PT and RICE/NSAID.  He was last seen 5/5/17 and was doing quite well with minimal discomfort.  Unfortunately he has noticed recurrence of pain and swelling, particularly with prolonged standing and ambulating.  He has pictures on his phone about how swollen his right foot can be.  He has been using his orthotic and icing.  No new injury noted.    Vitals:    10/26/17 0833   BP: 134/84   BP Location: Right arm   Patient Position: Sitting   Cuff Size: Adult Large   Pulse: 58   Weight: 232 lb (105.2 kg)   Height: 6' (1.829 m)   '      ROS - Pos for CC.  Patient denies current nausea, vomiting, chills, fevers, belly pain, calf pain, chest pain or SOB.  Complete remainder of ROS it otherwise neg.      PE:  Gen:   No apparent distress  Eye:    Visual scanning without deficit  Ear:    Response to auditory stimuli wnl  Lung:    Non-labored breathing on RA noted  Abd:    NTND per patient report  Lymph:    Mild edema medial L ankle  Vasc:    Pulses palpable, CFT minimally delayed  Neuro:    Light touch sensation intact to all sensory nerve distributions without paresthesias  Derm:    Neg for nodules, lesions or ulcerations  MSK:    Left lower extremity - pes planus.  Tenderness medial ankle along PT tendon, ligia from med mall to navicular tuberosity.  He also has pain over the anterolateral ankle, at the level of the sinus tarsi.  Pain levels low today, he states they can be quite debilitating with prolonged standing/ambulation  Calf:    Neg for redness, swelling or tenderness      Imaging:  MRI 5/12/17 - IMPRESSION: Unremarkable MRI of the left ankle. Specifically, no  deltoid ligament or posterior tibial tendon pathology is seen.      Assessment:  32 year old male with pes planus and PT tendonitis with sinus tarsi syndrome      Plan:  Discussed etiologies/options  1.  Pes  planus with PT tendonitis and pes planus; originally related to work-comp injury  -return to CAM, instructions discussed  -will use his ankle brace and supportive shoes at work  -RICE/NSAID prn  -tensogrip dispensed for edema control  -discussed PT; patient declined today, will consider in the future  -he asks if this will ever go away.  We discussed that while we can get his tendonitis and sinus tarsi symptoms under control, his pes planus may predispose him to future problems.     Follow up:  3 weeks or sooner with acute issues      Body mass index is 31.46 kg/(m^2).  Weight management plan: Patient was referred to their PCP to discuss a diet and exercise plan.         Prasanna Hinson DPM   Podiatric Foot & Ankle Surgeon  [facility]  [phone]

## 2017-10-26 NOTE — Clinical Note
Hi Dr Nathan  I saw Adan today for recurrent left foot problem.  We reviewed conservative therapy options and will see him back in 3 weeks for a recheck.  Thanks  Prasanna

## 2017-10-26 NOTE — NURSING NOTE
Chief Complaint   Patient presents with     RECHECK     Left ankle - WC, swelling and pain       Initial /84 (BP Location: Right arm, Patient Position: Sitting, Cuff Size: Adult Large)  Pulse 58  Ht 6' (1.829 m)  Wt 232 lb (105.2 kg)  BMI 31.46 kg/m2 Estimated body mass index is 31.46 kg/(m^2) as calculated from the following:    Height as of this encounter: 6' (1.829 m).    Weight as of this encounter: 232 lb (105.2 kg).  Medication Reconciliation: complete

## 2017-11-16 ENCOUNTER — OFFICE VISIT (OUTPATIENT)
Dept: PODIATRY | Facility: CLINIC | Age: 32
End: 2017-11-16
Payer: OTHER MISCELLANEOUS

## 2017-11-16 VITALS
HEART RATE: 57 BPM | SYSTOLIC BLOOD PRESSURE: 131 MMHG | WEIGHT: 232 LBS | HEIGHT: 72 IN | BODY MASS INDEX: 31.42 KG/M2 | DIASTOLIC BLOOD PRESSURE: 85 MMHG

## 2017-11-16 DIAGNOSIS — M21.42 PES PLANUS OF LEFT FOOT: ICD-10-CM

## 2017-11-16 DIAGNOSIS — M76.829 PTTD (POSTERIOR TIBIAL TENDON DYSFUNCTION): Primary | ICD-10-CM

## 2017-11-16 PROCEDURE — 99213 OFFICE O/P EST LOW 20 MIN: CPT | Performed by: PODIATRIST

## 2017-11-16 NOTE — PROGRESS NOTES
Foot & Ankle Surgery   November 16, 2017    S:  Pt is seen today for evaluation of left lower extremity pes planus and PTTD with sinus tarsi pain.  He's using an Aircast ankle brace and doing home RICE/NSAID prn.  While he certainly has limitations and can have pain with prolonged standing/walking, the brace helps and he's happy with his progress.  He doesn't get as much swelling medially, but rather he gets swelling at the anterior ankle.  His brace broke.      Vitals:    11/16/17 0919   BP: 131/85   BP Location: Right arm   Patient Position: Sitting   Cuff Size: Adult Large   Pulse: 57   Weight: 232 lb (105.2 kg)   Height: 6' (1.829 m)   '      ROS - Pos for CC.  Patient denies current nausea, vomiting, chills, fevers, belly pain, calf pain, chest pain or SOB.  Complete remainder of ROS it otherwise neg.      PE:  Gen:   No apparent distress  Eye:    Visual scanning without deficit  Ear:    Response to auditory stimuli wnl  Lung:    Non-labored breathing on RA noted  Abd:    NTND per patient report  Lymph:    Minimal edema medial or anterior L ankle today  Vasc:    Pulses palpable, CFT minimally delayed  Neuro:    Light touch sensation intact to all sensory nerve distributions without paresthesias  Derm:    Neg for nodules, lesions or ulcerations  MSK:    Left lower extremity - pes planus, but minimal pain along PT tendon and at sinus tarsi today.    Calf:    Neg for redness, swelling or tenderness      Assessment:  32 year old male with pes planus and PTTD with sinus tarsi syndrome      Plan:  Discussed etiologies/options  1.  Left lower extremity pes planus with PTTD and sinus tarsi syndrome; improving but still quite limiting  -supportive shoes, minimize shoeless ambulation  -continue brace as needed; a new brace was dispensed today  -discussed orthotics, PT, imaging and surgery options going forward  -while he is limited, he's happy with how the brace is working and I advised he continue current plan.  -if  today's plan fails to provide sufficient relief, whether it's 1 month or 1 year, he is to return for further discussion of more aggressive treatment options    Follow up:  prn or sooner with acute issues      Body mass index is 31.46 kg/(m^2).  Weight management plan: Patient was referred to their PCP to discuss a diet and exercise plan.         Prasanna Hinson DPM   Podiatric Foot & Ankle Surgeon  [facility]  [phone]

## 2017-11-16 NOTE — MR AVS SNAPSHOT
After Visit Summary   11/16/2017    Adan Adrian    MRN: [id number]           Patient Information     Date Of Birth          1985        Visit Information        Provider Department      11/16/2017 9:30 AM Prasanna Hinson DPM [facility]        Today's Diagnoses     PTTD (posterior tibial tendon dysfunction)    -  1    Pes planus of left foot           Follow-ups after your visit        Follow-up notes from your care team     Return if symptoms worsen or fail to improve.      Who to contact     If you have questions or need follow up information about today's clinic visit or your schedule please contact [facility] directly at [phone].  Normal or non-critical lab and imaging results will be communicated to you by SoundBetterhart, letter or phone within 4 business days after the clinic has received the results. If you do not hear from us within 7 days, please contact the clinic through SoundBetterhart or phone. If you have a critical or abnormal lab result, we will notify you by phone as soon as possible.  Submit refill requests through Ingo Money or call your pharmacy and they will forward the refill request to us. Please allow 3 business days for your refill to be completed.          Additional Information About Your Visit        MyChart Information     Ingo Money gives you secure access to your electronic health record. If you see a primary care provider, you can also send messages to your care team and make appointments. If you have questions, please call your primary care clinic.  If you do not have a primary care provider, please call [phone] and they will assist you.        Care EveryWhere ID     This is your Care EveryWhere ID. This could be used by other organizations to access your [place] medical records  [id number]        Your Vitals Were     Pulse Height BMI (Body Mass Index)             57 6' (1.829 m) 31.46 kg/m2          Blood Pressure from Last 3 Encounters:    11/16/17 131/85   10/26/17 134/84   07/11/17 124/80    Weight from Last 3 Encounters:   11/16/17 232 lb (105.2 kg)   10/26/17 232 lb (105.2 kg)   07/11/17 237 lb (107.5 kg)              Today, you had the following     No orders found for display         Today's Medication Changes          These changes are accurate as of: 11/16/17  9:44 AM.  If you have any questions, ask your nurse or doctor.               Start taking these medicines.        Dose/Directions    order for DME   Used for:  PTTD (posterior tibial tendon dysfunction), Pes planus of left foot   Started by:  Prasanna Hinson DPM        Equipment being ordered: Aircast ankle brace left lower extremity   Quantity:  1 Device   Refills:  0            Where to get your medicines      Some of these will need a paper prescription and others can be bought over the counter.  Ask your nurse if you have questions.     Bring a paper prescription for each of these medications     order for DME                Primary Care Provider Office Phone # Fax #    Arriola Praveen Epi Nathan [phone] [address] [address]        Equal Access to Services     [facility] AH: Hadii aad ku hadasho Sochidiali, waaxda luqadaha, qaybta kaalmada adeegyada, kai stein . So [facility] [phone].    ATENCIÓN: Si habla español, tiene a frias disposición servicios gratuitos de asistencia lingüística. Llame al [phone].    We comply with applicable federal civil rights laws and [place] laws. We do not discriminate on the basis of race, color, national origin, age, disability, sex, sexual orientation, or gender identity.            Thank you!     Thank you for choosing [facility]  for your care. Our goal is always to provide you with excellent care. Hearing back from our patients is one way we can continue to improve our services. Please take a few minutes to complete the written survey that you  may receive in the mail after your visit with us. Thank you!             Your Updated Medication List - Protect others around you: Learn how to safely use, store and throw away your medicines at www.disposemymeds.org.          This list is accurate as of: 11/16/17  9:44 AM.  Always use your most recent med list.                   Brand Name Dispense Instructions for use Diagnosis    hydrocortisone 2 % Lotn     1 Bottle    Externally apply topically 2 times daily    Pruritic rash       order for DME     1 Device    Equipment being ordered: Aircast ankle brace left lower extremity    PTTD (posterior tibial tendon dysfunction), Pes planus of left foot       permethrin 5 % cream    ELIMITE    60 g    Apply cream from head to toe (except the face); leave on for 8-14 hours then wash off with water; reapply in 1 week if live mites appear.    Scabies exposure

## 2017-11-16 NOTE — NURSING NOTE
Chief Complaint   Patient presents with     RECHECK     left foot - pes planus and PT tendonitis with sinus tarsi syndrome       Initial /85 (BP Location: Right arm, Patient Position: Sitting, Cuff Size: Adult Large)  Pulse 57  Ht 6' (1.829 m)  Wt 232 lb (105.2 kg)  BMI 31.46 kg/m2 Estimated body mass index is 31.46 kg/(m^2) as calculated from the following:    Height as of this encounter: 6' (1.829 m).    Weight as of this encounter: 232 lb (105.2 kg).  Medication Reconciliation: complete

## 2017-12-18 DIAGNOSIS — B86 SCABIES: Primary | ICD-10-CM

## 2017-12-18 RX ORDER — IVERMECTIN 3 MG/1
21 TABLET ORAL WEEKLY
Qty: 7 TABLET | Refills: 0 | Status: SHIPPED | OUTPATIENT
Start: 2017-12-18 | End: 2017-12-18

## 2017-12-18 RX ORDER — IVERMECTIN 3 MG/1
21 TABLET ORAL WEEKLY
Qty: 14 TABLET | Refills: 0 | Status: SHIPPED | OUTPATIENT
Start: 2017-12-18 | End: 2017-12-26

## 2018-02-19 ENCOUNTER — OFFICE VISIT (OUTPATIENT)
Dept: FAMILY MEDICINE | Facility: CLINIC | Age: 33
End: 2018-02-19
Payer: COMMERCIAL

## 2018-02-19 VITALS
HEART RATE: 94 BPM | SYSTOLIC BLOOD PRESSURE: 132 MMHG | OXYGEN SATURATION: 96 % | DIASTOLIC BLOOD PRESSURE: 66 MMHG | TEMPERATURE: 98 F

## 2018-02-19 DIAGNOSIS — H00.015 HORDEOLUM EXTERNUM OF LEFT LOWER EYELID: Primary | ICD-10-CM

## 2018-02-19 PROCEDURE — 99213 OFFICE O/P EST LOW 20 MIN: CPT | Performed by: INTERNAL MEDICINE

## 2018-02-19 RX ORDER — CEPHALEXIN 500 MG/1
500 CAPSULE ORAL 2 TIMES DAILY
Qty: 10 CAPSULE | Refills: 0 | Status: SHIPPED | OUTPATIENT
Start: 2018-02-19 | End: 2018-02-24

## 2018-02-19 RX ORDER — ERYTHROMYCIN 5 MG/G
1 OINTMENT OPHTHALMIC 3 TIMES DAILY
Qty: 1 TUBE | Refills: 0 | Status: SHIPPED | OUTPATIENT
Start: 2018-02-19 | End: 2019-05-13

## 2018-02-19 NOTE — PROGRESS NOTES
SUBJECTIVE:   Adan Adrian is a 32 year old male who presents to clinic today for the following health issues:  He is accompanied by his children    Chief Complaint   Patient presents with     Eye Problem     patient reporting swelling and irritation of L lower eye lid which start with irritation over this past weekend, currently using Tears eye drop OTC     Symptoms began 2 days ago   Became worse yesterday  The left eye is somewhat painful when water splashed on it  He does not have any pain with water being splashed in right eye  No difficulty opening or matting of the eye in the morning  No vision changes  No prior history of stye    He works as a cook in a restaurant        Problem list, Medication list, Allergies, and Medical/Social/Surgical/Family histories reviewed in [facility] and updated as appropriate.    Labs reviewed in EPIC      ROS:  Constitutional, HEENT, cardiovascular, pulmonary, GI, , musculoskeletal, neuro, skin, endocrine and psych systems are negative, except as otherwise noted.      This document serves as a record of the services and decisions personally performed and made by Fatou Hong MD. It was created on her behalf by Samia Martinez, a trained medical scribe. The creation of this document is based the provider's statements to the medical scribe.  Samia Martinez 2:43 PM February 19, 2018  OBJECTIVE:     /66  Pulse 94  Temp 98  F (36.7  C) (Oral)  SpO2 96%  There is no height or weight on file to calculate BMI.     GENERAL APPEARANCE: healthy, alert and no distress  EYES: left lower eyelid is very swollen, conjunctivae and sclerae appear normal      ASSESSMENT/PLAN:     Adan was seen today for eye problem.    Diagnoses and all orders for this visit:    Hordeolum externum of left lower eyelid  Eyelid is very swollen  I think this will require an incision to be made but will try treating conservatively first  Will try treating with oral and topical antibiotics  He should also apply  warm compresses to help with the swelling  If symptoms worsen or do not improve in 1-2 days, he should see the eye clinic and ask for same day appointment  -     erythromycin (ROMYCIN) ophthalmic ointment; Place 1 Application Into the left eye 3 times daily  -     cephALEXin (KEFLEX) 500 MG capsule; Take 1 capsule (500 mg) by mouth 2 times daily for 5 days    I told him to contact if he needs help with eye appointment       Patient Instructions   Will treat possible infection with oral antibiotics and antibiotic cream  Apply warm compresses to your eye to help the swelling    If this worsens or does not improve in 1-2 days, see the [facility] and ask for same day appointment  I put a referral in for you to see them  If they cannot get you in for same day appointment or if they are not covered by your insurance, contact us    Follow-up with Provider - as needed  Seek sooner medical attention if there is any worsening of symptoms or problems.      Sty (or Stye)  A sty is an infection of the oil gland of the eyelid. It may develop into a small pocket of pus (an abscess). This can cause pain, redness, and swelling. In early stages, a sty is treated with antibiotic cream, eye drops, or a small towel soaked in warm water (a warm compress). More severe cases may need to be opened and drained by a healthcare provider.  Home care    Eye drops or ointment are usually prescribed to treat the infection. Use these as directed.     Artificial tears may also be used to lubricate the eye and make it more comfortable. You can buy these over the counter without a prescription. Talk with your healthcare provider before using any over-the-counter treatment for a sty.    Apply a warm, damp towel to the affected eye for at least 5 minutes, 3 to 4 times a day for a week. Warm compresses open the pores and speed the healing. But if the compresses are too hot, they may burn your eyelid.    Sometimes the sty will drain with this  treatment alone. If this happens, keep using the antibiotic until all the redness and swelling are gone.    Wash your hands before and after touching the infected eyelid to avoid spreading the infection.    Don t squeeze or try to break open the sty.  Follow-up care  Follow up with your healthcare provider, or as advised.   When to seek medical advice  Call your healthcare provider right away if any of these occur:    Increase in swelling or redness around the eyelid after 48 to 72 hours    Increase in eye pain or the eyelid blisters    Increase in warmth--the eyelid feels hot    Drainage of blood or thick pus from the sty    Blister on the eyelid    Inability to open the eyelid due to swelling    Fever of 100.4 F (38 C) or above, or as directed by your provider    Vision changes    Headache or stiff neck    The sty comes back  Date Last Reviewed: 8/1/2017 2000-2017 [employer]. [address] [place] [address]. All rights reserved. This information is not intended as a substitute for professional medical care. Always follow your healthcare professional's instructions.              The information in this document, created by a scribe for me, accurately reflects the services I personally performed and the decisions made by me. I have reviewed and approved this document for accuracy.  3:00 PM February 19, 2018    Fatou Hong MD  [facility]

## 2018-02-19 NOTE — MR AVS SNAPSHOT
After Visit Summary   2/19/2018    Adan Adrian    MRN: [id number]           Patient Information     Date Of Birth          1985        Visit Information        Provider Department      2/19/2018 2:30 PM Fatou Hong MD [facility]        Today's Diagnoses     Hordeolum externum of left lower eyelid    -  1      Care Instructions    Will treat possible infection with oral antibiotics and antibiotic cream  Apply warm compresses to your eye to help the swelling    If this worsens or does not improve in 1-2 days, see the [facility] and ask for same day appointment  I put a referral in for you to see them  If they cannot get you in for same day appointment or if they are not covered by your insurance, contact us    Follow-up with Provider - as needed  Seek sooner medical attention if there is any worsening of symptoms or problems.      Sty (or Stye)  A sty is an infection of the oil gland of the eyelid. It may develop into a small pocket of pus (an abscess). This can cause pain, redness, and swelling. In early stages, a sty is treated with antibiotic cream, eye drops, or a small towel soaked in warm water (a warm compress). More severe cases may need to be opened and drained by a healthcare provider.  Home care    Eye drops or ointment are usually prescribed to treat the infection. Use these as directed.     Artificial tears may also be used to lubricate the eye and make it more comfortable. You can buy these over the counter without a prescription. Talk with your healthcare provider before using any over-the-counter treatment for a sty.    Apply a warm, damp towel to the affected eye for at least 5 minutes, 3 to 4 times a day for a week. Warm compresses open the pores and speed the healing. But if the compresses are too hot, they may burn your eyelid.    Sometimes the sty will drain with this treatment alone. If this happens, keep using the antibiotic until all the redness and  swelling are gone.    Wash your hands before and after touching the infected eyelid to avoid spreading the infection.    Don t squeeze or try to break open the sty.  Follow-up care  Follow up with your healthcare provider, or as advised.   When to seek medical advice  Call your healthcare provider right away if any of these occur:    Increase in swelling or redness around the eyelid after 48 to 72 hours    Increase in eye pain or the eyelid blisters    Increase in warmth--the eyelid feels hot    Drainage of blood or thick pus from the sty    Blister on the eyelid    Inability to open the eyelid due to swelling    Fever of 100.4 F (38 C) or above, or as directed by your provider    Vision changes    Headache or stiff neck    The sty comes back  Date Last Reviewed: 8/1/2017 2000-2017 [employer]. [address] [place] [address]. All rights reserved. This information is not intended as a substitute for professional medical care. Always follow your healthcare professional's instructions.                Follow-ups after your visit        Additional Services     OPHTHALMOLOGY ADULT REFERRAL       Your provider has referred you to: N: [facility] - [place] [phone] [email or web address]    Please be aware that coverage of these services is subject to the terms and limitations of your health insurance plan.  Call member services at your health plan with any benefit or coverage questions.      Please bring the following with you to your appointment:    (1) Any X-Rays, CTs or MRIs which have been performed.  Contact the facility where they were done to arrange for  prior to your scheduled appointment.    (2) List of current medications  (3) This referral request   (4) Any documents/labs given to you for this referral                  Who to contact     If you have questions or need follow up information about today's clinic visit or your schedule please  contact [facility] directly at [phone].  Normal or non-critical lab and imaging results will be communicated to you by MyChart, letter or phone within 4 business days after the clinic has received the results. If you do not hear from us within 7 days, please contact the clinic through "[employer]hart or phone. If you have a critical or abnormal lab result, we will notify you by phone as soon as possible.  Submit refill requests through [employer] or call your pharmacy and they will forward the refill request to us. Please allow 3 business days for your refill to be completed.          Additional Information About Your Visit        "[employer] Information     [employer] gives you secure access to your electronic health record. If you see a primary care provider, you can also send messages to your care team and make appointments. If you have questions, please call your primary care clinic.  If you do not have a primary care provider, please call [phone] and they will assist you.        Care EveryWhere ID     This is your Care EveryWhere ID. This could be used by other organizations to access your [place] medical records  [id number]        Your Vitals Were     Pulse Temperature Pulse Oximetry             94 98  F (36.7  C) (Oral) 96%          Blood Pressure from Last 3 Encounters:   02/19/18 132/66   11/16/17 131/85   10/26/17 134/84    Weight from Last 3 Encounters:   11/16/17 232 lb (105.2 kg)   10/26/17 232 lb (105.2 kg)   07/11/17 237 lb (107.5 kg)              We Performed the Following     OPHTHALMOLOGY ADULT REFERRAL          Today's Medication Changes          These changes are accurate as of 2/19/18  3:01 PM.  If you have any questions, ask your nurse or doctor.               Start taking these medicines.        Dose/Directions    cephALEXin 500 MG capsule   Commonly known as:  KEFLEX   Used for:  Hordeolum externum of left lower eyelid   Started by:  Fatou Hong MD        Dose:  500 mg   Take 1  capsule (500 mg) by mouth 2 times daily for 5 days   Quantity:  10 capsule   Refills:  0       erythromycin ophthalmic ointment   Commonly known as:  ROMYCIN   Used for:  Hordeolum externum of left lower eyelid   Started by:  Fatou Hong MD        Dose:  1 Application   Place 1 Application Into the left eye 3 times daily   Quantity:  1 Tube   Refills:  0         Stop taking these medicines if you haven't already. Please contact your care team if you have questions.     hydrocortisone 2 % Lotn   Stopped by:  Fatou Hong MD           order for DME   Stopped by:  Fatou Hong MD           permethrin 5 % cream   Commonly known as:  ELIMITE   Stopped by:  Fatou Hong MD                Where to get your medicines      These medications were sent to [facility], [place] - [address]  [address], [place] [address]     Phone:  [phone]     cephALEXin 500 MG capsule    erythromycin ophthalmic ointment                Primary Care Provider Office Phone # Fax #    Tidbhneh Praveen Epi Nathan [phone] [address]  [place] [address]        Equal Access to Services     ROSE MARY BLOCK AH: Hadii aad ku hadasho Duncan, waaxda luqadaha, qaybta kaalmada adeegyada, kai patterson. So [facility] [phone].    ATENCIÓN: Si habla español, tiene a frias disposición servicios gratuitos de asistencia lingüística. Ll[facility] [phone].    We comply with applicable federal civil rights laws and [place] laws. We do not discriminate on the basis of race, color, national origin, age, disability, sex, sexual orientation, or gender identity.            Thank you!     Thank you for choosing [facility]  for your care. Our goal is always to provide you with excellent care. Hearing back from our patients is one way we can continue to improve our services. Please take a few minutes to complete  the written survey that you may receive in the mail after your visit with us. Thank you!             Your Updated Medication List - Protect others around you: Learn how to safely use, store and throw away your medicines at www.disposemymeds.org.          This list is accurate as of 2/19/18  3:01 PM.  Always use your most recent med list.                   Brand Name Dispense Instructions for use Diagnosis    cephALEXin 500 MG capsule    KEFLEX    10 capsule    Take 1 capsule (500 mg) by mouth 2 times daily for 5 days    Hordeolum externum of left lower eyelid       erythromycin ophthalmic ointment    ROMYCIN    1 Tube    Place 1 Application Into the left eye 3 times daily    Hordeolum externum of left lower eyelid

## 2018-02-19 NOTE — PATIENT INSTRUCTIONS
Will treat possible infection with oral antibiotics and antibiotic cream  Apply warm compresses to your eye to help the swelling    If this worsens or does not improve in 1-2 days, see the [facility] and ask for same day appointment  I put a referral in for you to see them  If they cannot get you in for same day appointment or if they are not covered by your insurance, contact us    Follow-up with Provider - as needed  Seek sooner medical attention if there is any worsening of symptoms or problems.      Sty (or Stye)  A sty is an infection of the oil gland of the eyelid. It may develop into a small pocket of pus (an abscess). This can cause pain, redness, and swelling. In early stages, a sty is treated with antibiotic cream, eye drops, or a small towel soaked in warm water (a warm compress). More severe cases may need to be opened and drained by a healthcare provider.  Home care    Eye drops or ointment are usually prescribed to treat the infection. Use these as directed.     Artificial tears may also be used to lubricate the eye and make it more comfortable. You can buy these over the counter without a prescription. Talk with your healthcare provider before using any over-the-counter treatment for a sty.    Apply a warm, damp towel to the affected eye for at least 5 minutes, 3 to 4 times a day for a week. Warm compresses open the pores and speed the healing. But if the compresses are too hot, they may burn your eyelid.    Sometimes the sty will drain with this treatment alone. If this happens, keep using the antibiotic until all the redness and swelling are gone.    Wash your hands before and after touching the infected eyelid to avoid spreading the infection.    Don t squeeze or try to break open the sty.  Follow-up care  Follow up with your healthcare provider, or as advised.   When to seek medical advice  Call your healthcare provider right away if any of these occur:    Increase in swelling or redness  around the eyelid after 48 to 72 hours    Increase in eye pain or the eyelid blisters    Increase in warmth--the eyelid feels hot    Drainage of blood or thick pus from the sty    Blister on the eyelid    Inability to open the eyelid due to swelling    Fever of 100.4 F (38 C) or above, or as directed by your provider    Vision changes    Headache or stiff neck    The sty comes back  Date Last Reviewed: 8/1/2017 2000-2017 [employer]. [address] [place] [address]. All rights reserved. This information is not intended as a substitute for professional medical care. Always follow your healthcare professional's instructions.

## 2018-03-08 ENCOUNTER — OFFICE VISIT (OUTPATIENT)
Dept: URGENT CARE | Facility: URGENT CARE | Age: 33
End: 2018-03-08
Payer: COMMERCIAL

## 2018-03-08 VITALS
SYSTOLIC BLOOD PRESSURE: 145 MMHG | DIASTOLIC BLOOD PRESSURE: 86 MMHG | BODY MASS INDEX: 31.67 KG/M2 | OXYGEN SATURATION: 98 % | WEIGHT: 233.8 LBS | HEIGHT: 72 IN | HEART RATE: 85 BPM | TEMPERATURE: 98.5 F

## 2018-03-08 DIAGNOSIS — B37.0 THRUSH: ICD-10-CM

## 2018-03-08 DIAGNOSIS — R09.89 RUNNY NOSE: ICD-10-CM

## 2018-03-08 DIAGNOSIS — K21.9 GASTROESOPHAGEAL REFLUX DISEASE, ESOPHAGITIS PRESENCE NOT SPECIFIED: Primary | ICD-10-CM

## 2018-03-08 LAB
FLUAV+FLUBV AG SPEC QL: NEGATIVE
FLUAV+FLUBV AG SPEC QL: NEGATIVE
HBA1C MFR BLD: 5.6 % (ref 4.3–6)
SPECIMEN SOURCE: NORMAL
TROPONIN I SERPL-MCNC: <0.015 UG/L (ref 0–0.04)

## 2018-03-08 PROCEDURE — 87804 INFLUENZA ASSAY W/OPTIC: CPT | Mod: 59 | Performed by: FAMILY MEDICINE

## 2018-03-08 PROCEDURE — 80048 BASIC METABOLIC PNL TOTAL CA: CPT | Performed by: FAMILY MEDICINE

## 2018-03-08 PROCEDURE — 83036 HEMOGLOBIN GLYCOSYLATED A1C: CPT | Performed by: FAMILY MEDICINE

## 2018-03-08 PROCEDURE — 84484 ASSAY OF TROPONIN QUANT: CPT | Performed by: FAMILY MEDICINE

## 2018-03-08 PROCEDURE — 93000 ELECTROCARDIOGRAM COMPLETE: CPT | Performed by: FAMILY MEDICINE

## 2018-03-08 PROCEDURE — 87389 HIV-1 AG W/HIV-1&-2 AB AG IA: CPT | Performed by: FAMILY MEDICINE

## 2018-03-08 PROCEDURE — 36415 COLL VENOUS BLD VENIPUNCTURE: CPT | Performed by: FAMILY MEDICINE

## 2018-03-08 PROCEDURE — 99214 OFFICE O/P EST MOD 30 MIN: CPT | Performed by: FAMILY MEDICINE

## 2018-03-08 RX ORDER — NYSTATIN 100000/ML
500000 SUSPENSION, ORAL (FINAL DOSE FORM) ORAL 4 TIMES DAILY
Qty: 280 ML | Refills: 1 | Status: SHIPPED | OUTPATIENT
Start: 2018-03-08 | End: 2018-03-13

## 2018-03-08 NOTE — MR AVS SNAPSHOT
After Visit Summary   3/8/2018    Adan Adrian    MRN: [id number]           Patient Information     Date Of Birth          1985        Visit Information        Provider Department      3/8/2018 5:00 PM Peter Moura,  [facility]        Today's Diagnoses     Gastroesophageal reflux disease, esophagitis presence not specified    -  1    Thrush        Runny nose          Care Instructions    Tongue white plaques: thrush likely, mouthwash to use 4x a day until the white plaques are gone for at least 48 hours  Stomach/heartburn: zantac 150 mg twice a day          Follow-ups after your visit        Who to contact     If you have questions or need follow up information about today's clinic visit or your schedule please contact [facility] directly at [phone].  Normal or non-critical lab and imaging results will be communicated to you by MyChart, letter or phone within 4 business days after the clinic has received the results. If you do not hear from us within 7 days, please contact the clinic through MyChart or phone. If you have a critical or abnormal lab result, we will notify you by phone as soon as possible.  Submit refill requests through [employer] or call your pharmacy and they will forward the refill request to us. Please allow 3 business days for your refill to be completed.          Additional Information About Your Visit        MyChart Information     [employer] gives you secure access to your electronic health record. If you see a primary care provider, you can also send messages to your care team and make appointments. If you have questions, please call your primary care clinic.  If you do not have a primary care provider, please call [phone] and they will assist you.        Care EveryWhere ID     This is your Care EveryWhere ID. This could be used by other organizations to access your [place] medical records  [id number]         Your Vitals Were     Pulse Temperature Height Pulse Oximetry BMI (Body Mass Index)       85 98.5  F (36.9  C) (Oral) 6' (1.829 m) 98% 31.71 kg/m2        Blood Pressure from Last 3 Encounters:   03/08/18 145/86   02/19/18 132/66   11/16/17 131/85    Weight from Last 3 Encounters:   03/08/18 233 lb 12.8 oz (106.1 kg)   11/16/17 232 lb (105.2 kg)   10/26/17 232 lb (105.2 kg)              We Performed the Following     Basic metabolic panel  (Ca, Cl, CO2, Creat, Gluc, K, Na, BUN)     EKG 12-lead complete w/read - Clinics     Hemoglobin A1c     HIV Antigen Antibody Combo     Influenza A/B antigen     Troponin I          Today's Medication Changes          These changes are accurate as of 3/8/18  6:17 PM.  If you have any questions, ask your nurse or doctor.               Start taking these medicines.        Dose/Directions    nystatin 325377 UNIT/ML suspension   Commonly known as:  MYCOSTATIN   Used for:  Thrush   Started by:  Peter Moura DO        Dose:  649133 Units   Take 5 mLs (500,000 Units) by mouth 4 times daily   Quantity:  280 mL   Refills:  1       ranitidine 150 MG tablet   Commonly known as:  ZANTAC   Used for:  Gastroesophageal reflux disease, esophagitis presence not specified   Started by:  Peter Moura DO        Dose:  150 mg   Take 1 tablet (150 mg) by mouth 2 times daily   Quantity:  60 tablet   Refills:  1            Where to get your medicines      Some of these will need a paper prescription and others can be bought over the counter.  Ask your nurse if you have questions.     Bring a paper prescription for each of these medications     nystatin 920926 UNIT/ML suspension    ranitidine 150 MG tablet                Primary Care Provider Office Phone # Fax #    Arriola Praveen Nathan [phone] [address]  [facility] [address]        Equal Access to Services     PAL BLOCK AH: mary Carlos, jeramycristiano  kai sinhapaco stein ah. So [facility] [phone].    ATENCIÓN: Si anna contreras, tiene a frias disposición servicios gratuitos de asistencia lingüística. Kirby al [phone].    We comply with applicable federal civil rights laws and [place] laws. We do not discriminate on the basis of race, color, national origin, age, disability, sex, sexual orientation, or gender identity.            Thank you!     Thank you for choosing [facility]  for your care. Our goal is always to provide you with excellent care. Hearing back from our patients is one way we can continue to improve our services. Please take a few minutes to complete the written survey that you may receive in the mail after your visit with us. Thank you!             Your Updated Medication List - Protect others around you: Learn how to safely use, store and throw away your medicines at www.disposemymeds.org.          This list is accurate as of 3/8/18  6:17 PM.  Always use your most recent med list.                   Brand Name Dispense Instructions for use Diagnosis    erythromycin ophthalmic ointment    ROMYCIN    1 Tube    Place 1 Application Into the left eye 3 times daily    Hordeolum externum of left lower eyelid       nystatin 025930 UNIT/ML suspension    MYCOSTATIN    280 mL    Take 5 mLs (500,000 Units) by mouth 4 times daily    Thrush       ranitidine 150 MG tablet    ZANTAC    60 tablet    Take 1 tablet (150 mg) by mouth 2 times daily    Gastroesophageal reflux disease, esophagitis presence not specified

## 2018-03-08 NOTE — PROGRESS NOTES
SUBJECTIVE:   Adan Adrian is a 32 year old male presenting with a chief complaint of   Chief Complaint   Patient presents with     Urgent Care     Pt c/o stomach pain, diarrhea, runny nose and headache x2-3 days.     Patient has concerns for posterior headaches, runny nose, heartburn, mild epigastric discomfort for the last three days or so and additionally has noticed white plaques on tongue for the last day    - runny nose (yellow and dark red) for 3 days (resolved) currently, no sick contacts  -headaches, back of head and dull in nature, for the last few days (ongoing), took tylenol but only got minimal relief from that, has had this to a lesser extent before  -  Mild epigastric, non radiating stomach disocmfort for last several days with diarrhea (resolved) for a few days but now that is improved (ongoing). He has not been taking any NSAIDs.   -also experiencing heartburn for the three days, took tums  And this went away for hour but then came back, this is currently ongoing, this heartburn seems to be more severe than in the past  - endorses some generalized body weakness, no shortness of breath, no nausea, diarrhea, fevers, dysuria, polyuria, polydipsia  - no sick contacts  - also brushing tongue more as tongue is getting white in the last day or two, he has not had this problem in the past and says no matter how much he scrubs the tongue that there is white plaques still present.    In summary, patient says overall he feels a fair amount better today than three days ago but does note a lesser amount of heartburn    He is an established patient of [facility].    Review of Systems: As above    No past medical history on file.  Family History   Problem Relation Age of Onset     DIABETES Mother      Type 2     C.A.D. Father      MI age 55 ()     DIABETES Sister      Cancer - colorectal No family hx of      Prostate Cancer No family hx of      Current Outpatient Prescriptions   Medication Sig Dispense Refill      ranitidine (ZANTAC) 150 MG tablet Take 1 tablet (150 mg) by mouth 2 times daily 60 tablet 1     nystatin (MYCOSTATIN) 180733 UNIT/ML suspension Take 5 mLs (500,000 Units) by mouth 4 times daily 280 mL 1     erythromycin (ROMYCIN) ophthalmic ointment Place 1 Application Into the left eye 3 times daily (Patient not taking: Reported on 3/8/2018) 1 Tube 0     Social History   Substance Use Topics     Smoking status: Never Smoker     Smokeless tobacco: Never Used     Alcohol use 0.0 oz/week     0 Standard drinks or equivalent per week      Comment: maybe every 2 weeks or a party        OBJECTIVE  /86  Pulse 85  Temp 98.5  F (36.9  C) (Oral)  Ht 6' (1.829 m)  Wt 233 lb 12.8 oz (106.1 kg)  SpO2 98%  BMI 31.71 kg/m2    Physical Exam  HEENT: TMs clear without erythema. Mild to moderate thrush noted on the tongue, no evidence of white plaques in posterior pharynx. No tonsillar swelling or erythema in posterior pharynx. No significant adenopathy in the neck  Heart: RRR without murmurs  Lungs: CTA, no increased work of breathing  Abdomen: BS active, minimal epigastric tenderness, no tenderness elsewhere, no rebound or guarding      Labs:  Results for orders placed or performed in visit on 03/08/18 (from the past 24 hour(s))   Hemoglobin A1c   Result Value Ref Range    Hemoglobin A1C 5.6 4.3 - 6.0 %   Influenza A/B antigen   Result Value Ref Range    Influenza A/B Agn Specimen Nasal     Influenza A Negative NEG^Negative    Influenza B Negative NEG^Negative       X-Ray was not done.    ASSESSMENT:      ICD-10-CM    1. Gastroesophageal reflux disease, esophagitis presence not specified K21.9 Troponin I     EKG 12-lead complete w/read - Clinics     ranitidine (ZANTAC) 150 MG tablet     DISCONTINUED: ranitidine (ZANTAC) 150 MG tablet     DISCONTINUED: ranitidine (ZANTAC) 150 MG tablet     CANCELED: EKG 12-lead complete w/read - Clinics   2. Thrush B37.0 Basic metabolic panel  (Ca, Cl, CO2, Creat, Gluc, K, Na, BUN)      Hemoglobin A1c     HIV Antigen Antibody Combo     nystatin (MYCOSTATIN) 202010 UNIT/ML suspension   3. Runny nose R09.89 Influenza A/B antigen        Medical Decision Making:    Differential includes one or multiple medical problems including but not limited to: thrush, viral illness, influenza, GERD, gastroenteritis, CAD, type 2 diabetes etc.    Heartburn/mild stomach discomfort: Likely gastroenteritis given lack of nausea and vomiting.  No evidence of diabetes as A1c 5.6%.  Given patient does have an EKG from several years ago that shows Q waves in inferior leads and has actually had a stress test we did obtain an EKG which was unchanged this evening from the 1 2 years ago and also obtained a troponin which was unremarkable.  Would expect troponin to be elevated given symptoms have been present for 3 days if this was heart related.  Will send patient home with ranitidine 150 mg twice daily and treat as heartburn/GERD. No NSAID use and has had negative H. Pylori testing a few years ago.  This could also be related to the thrush however no white plaques were noted in the posterior oropharynx on physical exam.  If he is to have trouble swallowing he is to return immediately.    Thrush: We will treat with nystatin swish and swallow until white plaques resolved for 48 hours.  Hemoglobin A1c normal.  HIV pending.    Runny nose/posterior headache: Could be secondary to reflux or viral illness.  Encourage fluids and treatment with ranitidine as noted above.    Yazan Moura DO    Patient call back number: [phone]    Followup:    F/U with PCP tomorrow if has worsening of heartburn, thrush, develops chest pain, or trouble breathing otherwise follow up Monday with PCP.

## 2018-03-09 LAB
ANION GAP SERPL CALCULATED.3IONS-SCNC: 9 MMOL/L (ref 3–14)
BUN SERPL-MCNC: 8 MG/DL (ref 7–30)
CALCIUM SERPL-MCNC: 9 MG/DL (ref 8.5–10.1)
CHLORIDE SERPL-SCNC: 106 MMOL/L (ref 94–109)
CO2 SERPL-SCNC: 23 MMOL/L (ref 20–32)
CREAT SERPL-MCNC: 0.75 MG/DL (ref 0.66–1.25)
GFR SERPL CREATININE-BSD FRML MDRD: >90 ML/MIN/1.7M2
GLUCOSE SERPL-MCNC: 88 MG/DL (ref 70–99)
HIV 1+2 AB+HIV1 P24 AG SERPL QL IA: NONREACTIVE
POTASSIUM SERPL-SCNC: 4.2 MMOL/L (ref 3.4–5.3)
SODIUM SERPL-SCNC: 138 MMOL/L (ref 133–144)

## 2018-03-09 NOTE — PATIENT INSTRUCTIONS
Tongue white plaques: thrush likely, mouthwash to use 4x a day until the white plaques are gone for at least 48 hours  Stomach/heartburn: zantac 150 mg twice a day

## 2018-03-10 ENCOUNTER — TELEPHONE (OUTPATIENT)
Dept: FAMILY MEDICINE | Facility: CLINIC | Age: 33
End: 2018-03-10

## 2018-03-11 NOTE — TELEPHONE ENCOUNTER
Called and spoke with patient and notified him his HIV test was negative and so he DOES NOT have HIV.    Yazan Moura DO

## 2018-03-13 ENCOUNTER — OFFICE VISIT (OUTPATIENT)
Dept: FAMILY MEDICINE | Facility: CLINIC | Age: 33
End: 2018-03-13
Payer: COMMERCIAL

## 2018-03-13 VITALS
WEIGHT: 232 LBS | BODY MASS INDEX: 31.46 KG/M2 | DIASTOLIC BLOOD PRESSURE: 70 MMHG | SYSTOLIC BLOOD PRESSURE: 115 MMHG | TEMPERATURE: 96.9 F | OXYGEN SATURATION: 96 % | HEART RATE: 68 BPM

## 2018-03-13 DIAGNOSIS — R19.5 ACHOLIC STOOL: ICD-10-CM

## 2018-03-13 DIAGNOSIS — A08.4 VIRAL GASTROENTERITIS: Primary | ICD-10-CM

## 2018-03-13 PROCEDURE — 36415 COLL VENOUS BLD VENIPUNCTURE: CPT | Performed by: INTERNAL MEDICINE

## 2018-03-13 PROCEDURE — 99213 OFFICE O/P EST LOW 20 MIN: CPT | Performed by: INTERNAL MEDICINE

## 2018-03-13 PROCEDURE — 80076 HEPATIC FUNCTION PANEL: CPT | Performed by: INTERNAL MEDICINE

## 2018-03-13 ASSESSMENT — ENCOUNTER SYMPTOMS
CHILLS: 0
CONSTIPATION: 0
BLOOD IN STOOL: 0
HEMOPTYSIS: 0
ABDOMINAL PAIN: 0
NERVOUS/ANXIOUS: 1
SPUTUM PRODUCTION: 0
SINUS PAIN: 0
SORE THROAT: 0
DIARRHEA: 0
MYALGIAS: 0
FEVER: 0
NAUSEA: 0
HEARTBURN: 1
WEIGHT LOSS: 0
COUGH: 1
VOMITING: 0
SHORTNESS OF BREATH: 0

## 2018-03-13 NOTE — MR AVS SNAPSHOT
After Visit Summary   3/13/2018    Adan Adrian    MRN: [id number]           Patient Information     Date Of Birth          1985        Visit Information        Provider Department      3/13/2018 12:00 PM Toño Allred MD [facility]        Today's Diagnoses     Acholic stool    -  1      Care Instructions      Call doctor if your symptoms recurs/persist/worsens, or if you develop new symptoms.      Viral Gastroenteritis (Adult)    Gastroenteritis is commonly called the stomach flu. It is most often caused by a virus that affects the stomach and intestinal tract and usually lasts from 2 to 7 days. Common viruses causing gastroenteritis include norovirus, rotavirus, and hepatitis A. Non-viral causes of gastroenteritis include bacteria, parasites, and toxins.  The danger from repeated vomiting or diarrhea is dehydration. This is the loss of too much fluid from the body. When this occurs, body fluids must be replaced. Antibiotics do not help with this illness because it is usually viral.Simple home treatment will be helpful.  Symptoms of viral gastroenteritis may include:    Watery, loose stools    Stomach pain or abdominal cramps    Fever and chills    Nausea and vomiting    Loss of bowel control    Headache  Home care  Gastroenteritis is transmitted by contact with the stool or vomit of an infected person. This can occur from person to person or from contact with a contaminated surface.  Follow these guidelines when caring for yourself at home:    If symptoms are severe, rest at home for the next 24 hours or until you are feeling better.    Wash your hands with soap and water or use alcohol-based  to prevent the spread of infection. Wash your hands after touching anyone who is sick.    Wash your hands or use alcohol-based  after using the toilet and before meals. Clean the toilet after each use.  Remember these tips when preparing food:    People with  diarrhea should not prepare or serve food to others. When preparing foods, wash your hands before and after.    Wash your hands after using cutting boards, countertops, knives, or utensils that have been in contact with raw food.    Keep uncooked meats away from cooked and ready-to-eat foods.  Medicine  You may use acetaminophen or NSAID medicines like ibuprofen or naproxen to control fever unless another medicine was given. If you have chronic liver or kidney disease, talk with your healthcare provider before using these medicines. Also talk with your provider if you've had a stomach ulcer or gastrointestinal bleeding. Don't give aspirin to anyone under 18 years of age who is ill with a fever. It may cause severe liver damage. Don't use NSAIDS is you are already taking one for another condition (like arthritis) or are on aspirin (such as for heart disease or after a stroke).  If medicine for vomiting or diarrhea are prescribed, take these only as directed. Do not take over-the-counter medicines for vomiting or diarrhea unless instructed by your healthcare provider.  Diet  Follow these guidelines for food:    Water and liquids are important so you don't get dehydrated. Drink a small amount at a time or suck on ice chips if you are vomiting.    If you eat, avoid fatty, greasy, spicy, or fried foods.    Don't eat dairy if you have diarrhea. This can make diarrhea worse.    Avoid tobacco, alcohol, and caffeine which may worsen symptoms.  During the first 24 hours (the first full day), follow the diet below:    Beverages. Sports drinks, soft drinks without caffeine, ginger ale, mineral water (plain or flavored), decaffeinated tea and coffee. If you are very dehydrated, sports drinks aren't a good choice. They have too much sugar and not enough electrolytes. In this case, commercially available products called oral rehydration solutions, are best.    Soups. Eat clear broth, consommé, and bouillon.    Desserts. Eat  gelatin, popsicles, and fruit juice bars.  During the next 24 hours (the second day), you may add the following to the above:    Hot cereal, plain toast, bread, rolls, and crackers    Plain noodles, rice, mashed potatoes, chicken noodle or rice soup    Unsweetened canned fruit (avoid pineapple), bananas    Limit fat intake to less than 15 grams per day. Do this by avoiding margarine, butter, oils, mayonnaise, sauces, gravies, fried foods, peanut butter, meat, poultry, and fish.    Limit fiber and avoid raw or cooked vegetables, fresh fruits (except bananas), and bran cereals.    Limit caffeine and chocolate. Don't use spices or seasonings other than salt.    Limit dairy products.    Avoid alcohol.  During the next 24 hours:    Gradually resume a normal diet as you feel better and your symptoms improve.    If at any time it starts getting worse again, go back to clear liquids until you feel better.  Follow-up care  Follow up with your healthcare provider, or as advised. Call your provider if you don't get better within 24 hours or if diarrhea lasts more than a week. Also follow up if you are unable to keep down liquids and get dehydrated. If a stool (diarrhea) sample was taken, call as directed for the results.  Call 911  Call 911 if any of these occur:    Trouble breathing    Chest pain    Confused    Severe drowsiness or trouble awakening    Fainting or loss of consciousness    Rapid heart rate    Seizure    Stiff neck  When to seek medical advice  Call your healthcare provider right away if any of these occur:    Abdominal pain that gets worse    Continued vomiting (unable to keep liquids down)    Frequent diarrhea (more than 5 times a day)    Blood in vomit or stool (black or red color)    Dark urine, reduced urine output, or extreme thirst    Weakness or dizziness    Drowsiness    Fever of 100.4 F (38 C) oral or higher that does not get better with fever medicine    New rash  Date Last Reviewed: 1/3/2016     [id number] [employer]. [address] [place] [address], [place] [address]. All rights reserved. This information is not intended as a substitute for professional medical care. Always follow your healthcare professional's instructions.                Follow-ups after your visit        Who to contact     If you have questions or need follow up information about today's clinic visit or your schedule please contact [facility] directly at [phone].  Normal or non-critical lab and imaging results will be communicated to you by psicofxphart, letter or phone within 4 business days after the clinic has received the results. If you do not hear from us within 7 days, please contact the clinic through [employer] or phone. If you have a critical or abnormal lab result, we will notify you by phone as soon as possible.  Submit refill requests through [employer] or call your pharmacy and they will forward the refill request to us. Please allow 3 business days for your refill to be completed.          Additional Information About Your Visit        [employer]phar[employer]pi[employer]rystals Information     [employer] gives you secure access to your electronic health record. If you see a primary care provider, you can also send messages to your care team and make appointments. If you have questions, please call your primary care clinic.  If you do not have a primary care provider, please call [phone] and they will assist you.        Care EveryWhere ID     This is your Care EveryWhere ID. This could be used by other organizations to access your [facility] medical records  [id number]        Your Vitals Were     Pulse Temperature Pulse Oximetry BMI (Body Mass Index)          68 96.9  F (36.1  C) (Tympanic) 96% 31.46 kg/m2         Blood Pressure from Last 3 Encounters:   03/13/18 115/70   03/08/18 145/86   02/19/18 132/66    Weight from Last 3 Encounters:   03/13/18 232 lb (105.2 kg)   03/08/18 233 lb 12.8 oz (106.1 kg)   11/16/17 232 lb (105.2 kg)               We Performed the Following     Hepatic panel        Primary Care Provider Office Phone # Fax #    Toño Reinaldo Allred [phone] [address] [address]        Equal Access to Services     COSTAROSE MARY MCKAYLA : Hadii aad ku hadasho Soomaali, waaxda luqadaha, qaybta kaalmada adeegyada, waxay idiin hayaan adepaco ayala layanin . So [facility] [phone].    ATENCIÓN: Si habla español, tiene a frias disposición servicios gratuitos de asistencia lingüística. Llame al [phone].    We comply with applicable federal civil rights laws and [place] laws. We do not discriminate on the basis of race, color, national origin, age, disability, sex, sexual orientation, or gender identity.            Thank you!     Thank you for choosing [facility]  for your care. Our goal is always to provide you with excellent care. Hearing back from our patients is one way we can continue to improve our services. Please take a few minutes to complete the written survey that you may receive in the mail after your visit with us. Thank you!             Your Updated Medication List - Protect others around you: Learn how to safely use, store and throw away your medicines at www.disposemymeds.org.          This list is accurate as of 3/13/18 12:22 PM.  Always use your most recent med list.                   Brand Name Dispense Instructions for use Diagnosis    erythromycin ophthalmic ointment    ROMYCIN    1 Tube    Place 1 Application Into the left eye 3 times daily    Hordeolum externum of left lower eyelid       ranitidine 150 MG tablet    ZANTAC    60 tablet    Take 1 tablet (150 mg) by mouth 2 times daily    Gastroesophageal reflux disease, esophagitis presence not specified

## 2018-03-13 NOTE — NURSING NOTE
Chief Complaint   Patient presents with     ER F/U     Urgent Care visit on 3/8/2018       Initial /70 (BP Location: Left arm, Patient Position: Chair, Cuff Size: Adult Large)  Pulse 68  Temp 96.9  F (36.1  C) (Tympanic)  Wt 232 lb (105.2 kg)  SpO2 96%  BMI 31.46 kg/m2 Estimated body mass index is 31.46 kg/(m^2) as calculated from the following:    Height as of 3/8/18: 6' (1.829 m).    Weight as of this encounter: 232 lb (105.2 kg).  Medication Reconciliation: complete   Ami Almonte, CMA

## 2018-03-13 NOTE — PROGRESS NOTES
HPI    SUBJECTIVE:   Adan Adrian is a 32 year old male who presents to clinic today for the following health issues:      ED/UC Followup:    Facility:  [facility]  Date of visit: 3/8/2018  Reason for visit: Stomach pain, diarrhea, runny nose, and headache  Current Status: Started Ranitidine prescribed by  physician, patient states his stomach feels better,  However the color of his stool has changed to a bright yellow, no noticeable blood.       Patient was recently at urgent care with complaints of abdominal pain, diarrhea, occipital headache, and URI symptoms.    URI symptoms, headache, abdominal pain have improved and diarrhea has resolved; however, the patient is concerned about his stools being light-yellowish in color (but has actually turned darker brown starting yesterday).  No fever or chills.      Past medical history: denies history of liver disease      Review of Systems   Constitutional: Negative for chills, fever, malaise/fatigue and weight loss.   HENT: Positive for congestion (mild). Negative for sinus pain and sore throat.    Respiratory: Positive for cough (occasional). Negative for hemoptysis, sputum production and shortness of breath.    Cardiovascular: Negative for chest pain.   Gastrointestinal: Positive for heartburn (improved). Negative for abdominal pain, blood in stool, constipation, diarrhea, melena, nausea and vomiting.   Genitourinary:        No tea-colored urine   Musculoskeletal: Negative for myalgias.   Skin: Negative for rash.   Psychiatric/Behavioral: The patient is nervous/anxious (about his recent illness).        /70 (BP Location: Left arm, Patient Position: Chair, Cuff Size: Adult Large)  Pulse 68  Temp 96.9  F (36.1  C) (Tympanic)  Wt 232 lb (105.2 kg)  SpO2 96%  BMI 31.46 kg/m2      Physical Exam   Constitutional: He is oriented to person, place, and time. No distress.   Eyes: No scleral icterus.   Pulmonary/Chest: Effort normal and breath  sounds normal. No respiratory distress.   Abdominal: Soft. There is no tenderness.   Neurological: He is alert and oriented to person, place, and time. GCS score is 15.   Skin: No rash noted.   No jaundice   Psychiatric: Affect normal.   anxious   Vitals reviewed.        ICD-10-CM    1. Viral gastroenteritis A08.4     Reassured and explained to patient; diarrhea has resolved and stools are returning to usual color; no GI red flags   2. Acholic stool R19.5 Hepatic panel     **please refer to HPI for status of conditions      Patient Instructions     Call doctor if your symptoms recurs/persist/worsens, or if you develop new symptoms.      Viral Gastroenteritis (Adult)    Gastroenteritis is commonly called the stomach flu. It is most often caused by a virus that affects the stomach and intestinal tract and usually lasts from 2 to 7 days. Common viruses causing gastroenteritis include norovirus, rotavirus, and hepatitis A. Non-viral causes of gastroenteritis include bacteria, parasites, and toxins.  The danger from repeated vomiting or diarrhea is dehydration. This is the loss of too much fluid from the body. When this occurs, body fluids must be replaced. Antibiotics do not help with this illness because it is usually viral.Simple home treatment will be helpful.  Symptoms of viral gastroenteritis may include:    Watery, loose stools    Stomach pain or abdominal cramps    Fever and chills    Nausea and vomiting    Loss of bowel control    Headache  Home care  Gastroenteritis is transmitted by contact with the stool or vomit of an infected person. This can occur from person to person or from contact with a contaminated surface.  Follow these guidelines when caring for yourself at home:    If symptoms are severe, rest at home for the next 24 hours or until you are feeling better.    Wash your hands with soap and water or use alcohol-based  to prevent the spread of infection. Wash your hands after touching anyone  who is sick.    Wash your hands or use alcohol-based  after using the toilet and before meals. Clean the toilet after each use.  Remember these tips when preparing food:    People with diarrhea should not prepare or serve food to others. When preparing foods, wash your hands before and after.    Wash your hands after using cutting boards, countertops, knives, or utensils that have been in contact with raw food.    Keep uncooked meats away from cooked and ready-to-eat foods.  Medicine  You may use acetaminophen or NSAID medicines like ibuprofen or naproxen to control fever unless another medicine was given. If you have chronic liver or kidney disease, talk with your healthcare provider before using these medicines. Also talk with your provider if you've had a stomach ulcer or gastrointestinal bleeding. Don't give aspirin to anyone under 18 years of age who is ill with a fever. It may cause severe liver damage. Don't use NSAIDS is you are already taking one for another condition (like arthritis) or are on aspirin (such as for heart disease or after a stroke).  If medicine for vomiting or diarrhea are prescribed, take these only as directed. Do not take over-the-counter medicines for vomiting or diarrhea unless instructed by your healthcare provider.  Diet  Follow these guidelines for food:    Water and liquids are important so you don't get dehydrated. Drink a small amount at a time or suck on ice chips if you are vomiting.    If you eat, avoid fatty, greasy, spicy, or fried foods.    Don't eat dairy if you have diarrhea. This can make diarrhea worse.    Avoid tobacco, alcohol, and caffeine which may worsen symptoms.  During the first 24 hours (the first full day), follow the diet below:    Beverages. Sports drinks, soft drinks without caffeine, ginger ale, mineral water (plain or flavored), decaffeinated tea and coffee. If you are very dehydrated, sports drinks aren't a good choice. They have too much sugar  and not enough electrolytes. In this case, commercially available products called oral rehydration solutions, are best.    Soups. Eat clear broth, consommé, and bouillon.    Desserts. Eat gelatin, popsicles, and fruit juice bars.  During the next 24 hours (the second day), you may add the following to the above:    Hot cereal, plain toast, bread, rolls, and crackers    Plain noodles, rice, mashed potatoes, chicken noodle or rice soup    Unsweetened canned fruit (avoid pineapple), bananas    Limit fat intake to less than 15 grams per day. Do this by avoiding margarine, butter, oils, mayonnaise, sauces, gravies, fried foods, peanut butter, meat, poultry, and fish.    Limit fiber and avoid raw or cooked vegetables, fresh fruits (except bananas), and bran cereals.    Limit caffeine and chocolate. Don't use spices or seasonings other than salt.    Limit dairy products.    Avoid alcohol.  During the next 24 hours:    Gradually resume a normal diet as you feel better and your symptoms improve.    If at any time it starts getting worse again, go back to clear liquids until you feel better.  Follow-up care  Follow up with your healthcare provider, or as advised. Call your provider if you don't get better within 24 hours or if diarrhea lasts more than a week. Also follow up if you are unable to keep down liquids and get dehydrated. If a stool (diarrhea) sample was taken, call as directed for the results.  Call 911  Call 911 if any of these occur:    Trouble breathing    Chest pain    Confused    Severe drowsiness or trouble awakening    Fainting or loss of consciousness    Rapid heart rate    Seizure    Stiff neck  When to seek medical advice  Call your healthcare provider right away if any of these occur:    Abdominal pain that gets worse    Continued vomiting (unable to keep liquids down)    Frequent diarrhea (more than 5 times a day)    Blood in vomit or stool (black or red color)    Dark urine, reduced urine output, or  extreme thirst    Weakness or dizziness    Drowsiness    Fever of 100.4 F (38 C) oral or higher that does not get better with fever medicine    New rash  Date Last Reviewed: 1/3/2016    7561-4479 [employer]. [address] [place] [address], [place] [address]. All rights reserved. This information is not intended as a substitute for professional medical care. Always follow your healthcare professional's instructions.

## 2018-03-13 NOTE — PATIENT INSTRUCTIONS
Call doctor if your symptoms recurs/persist/worsens, or if you develop new symptoms.      Viral Gastroenteritis (Adult)    Gastroenteritis is commonly called the stomach flu. It is most often caused by a virus that affects the stomach and intestinal tract and usually lasts from 2 to 7 days. Common viruses causing gastroenteritis include norovirus, rotavirus, and hepatitis A. Non-viral causes of gastroenteritis include bacteria, parasites, and toxins.  The danger from repeated vomiting or diarrhea is dehydration. This is the loss of too much fluid from the body. When this occurs, body fluids must be replaced. Antibiotics do not help with this illness because it is usually viral.Simple home treatment will be helpful.  Symptoms of viral gastroenteritis may include:    Watery, loose stools    Stomach pain or abdominal cramps    Fever and chills    Nausea and vomiting    Loss of bowel control    Headache  Home care  Gastroenteritis is transmitted by contact with the stool or vomit of an infected person. This can occur from person to person or from contact with a contaminated surface.  Follow these guidelines when caring for yourself at home:    If symptoms are severe, rest at home for the next 24 hours or until you are feeling better.    Wash your hands with soap and water or use alcohol-based  to prevent the spread of infection. Wash your hands after touching anyone who is sick.    Wash your hands or use alcohol-based  after using the toilet and before meals. Clean the toilet after each use.  Remember these tips when preparing food:    People with diarrhea should not prepare or serve food to others. When preparing foods, wash your hands before and after.    Wash your hands after using cutting boards, countertops, knives, or utensils that have been in contact with raw food.    Keep uncooked meats away from cooked and ready-to-eat foods.  Medicine  You may use acetaminophen or NSAID medicines like  ibuprofen or naproxen to control fever unless another medicine was given. If you have chronic liver or kidney disease, talk with your healthcare provider before using these medicines. Also talk with your provider if you've had a stomach ulcer or gastrointestinal bleeding. Don't give aspirin to anyone under 18 years of age who is ill with a fever. It may cause severe liver damage. Don't use NSAIDS is you are already taking one for another condition (like arthritis) or are on aspirin (such as for heart disease or after a stroke).  If medicine for vomiting or diarrhea are prescribed, take these only as directed. Do not take over-the-counter medicines for vomiting or diarrhea unless instructed by your healthcare provider.  Diet  Follow these guidelines for food:    Water and liquids are important so you don't get dehydrated. Drink a small amount at a time or suck on ice chips if you are vomiting.    If you eat, avoid fatty, greasy, spicy, or fried foods.    Don't eat dairy if you have diarrhea. This can make diarrhea worse.    Avoid tobacco, alcohol, and caffeine which may worsen symptoms.  During the first 24 hours (the first full day), follow the diet below:    Beverages. Sports drinks, soft drinks without caffeine, ginger ale, mineral water (plain or flavored), decaffeinated tea and coffee. If you are very dehydrated, sports drinks aren't a good choice. They have too much sugar and not enough electrolytes. In this case, commercially available products called oral rehydration solutions, are best.    Soups. Eat clear broth, consommé, and bouillon.    Desserts. Eat gelatin, popsicles, and fruit juice bars.  During the next 24 hours (the second day), you may add the following to the above:    Hot cereal, plain toast, bread, rolls, and crackers    Plain noodles, rice, mashed potatoes, chicken noodle or rice soup    Unsweetened canned fruit (avoid pineapple), bananas    Limit fat intake to less than 15 grams per day. Do  this by avoiding margarine, butter, oils, mayonnaise, sauces, gravies, fried foods, peanut butter, meat, poultry, and fish.    Limit fiber and avoid raw or cooked vegetables, fresh fruits (except bananas), and bran cereals.    Limit caffeine and chocolate. Don't use spices or seasonings other than salt.    Limit dairy products.    Avoid alcohol.  During the next 24 hours:    Gradually resume a normal diet as you feel better and your symptoms improve.    If at any time it starts getting worse again, go back to clear liquids until you feel better.  Follow-up care  Follow up with your healthcare provider, or as advised. Call your provider if you don't get better within 24 hours or if diarrhea lasts more than a week. Also follow up if you are unable to keep down liquids and get dehydrated. If a stool (diarrhea) sample was taken, call as directed for the results.  Call 911  Call 911 if any of these occur:    Trouble breathing    Chest pain    Confused    Severe drowsiness or trouble awakening    Fainting or loss of consciousness    Rapid heart rate    Seizure    Stiff neck  When to seek medical advice  Call your healthcare provider right away if any of these occur:    Abdominal pain that gets worse    Continued vomiting (unable to keep liquids down)    Frequent diarrhea (more than 5 times a day)    Blood in vomit or stool (black or red color)    Dark urine, reduced urine output, or extreme thirst    Weakness or dizziness    Drowsiness    Fever of 100.4 F (38 C) oral or higher that does not get better with fever medicine    New rash  Date Last Reviewed: 1/3/2016    6365-5841 [employer]. [address] [place] [address], [place] [address]. All rights reserved. This information is not intended as a substitute for professional medical care. Always follow your healthcare professional's instructions.

## 2018-03-14 LAB
ALBUMIN SERPL-MCNC: 3.8 G/DL (ref 3.4–5)
ALP SERPL-CCNC: 108 U/L (ref 40–150)
ALT SERPL W P-5'-P-CCNC: 47 U/L (ref 0–70)
AST SERPL W P-5'-P-CCNC: 22 U/L (ref 0–45)
BILIRUB DIRECT SERPL-MCNC: <0.1 MG/DL (ref 0–0.2)
BILIRUB SERPL-MCNC: 0.5 MG/DL (ref 0.2–1.3)
PROT SERPL-MCNC: 7.6 G/DL (ref 6.8–8.8)

## 2019-05-13 ENCOUNTER — OFFICE VISIT (OUTPATIENT)
Dept: FAMILY MEDICINE | Facility: CLINIC | Age: 34
End: 2019-05-13
Payer: COMMERCIAL

## 2019-05-13 VITALS
OXYGEN SATURATION: 97 % | HEART RATE: 64 BPM | WEIGHT: 236.9 LBS | SYSTOLIC BLOOD PRESSURE: 127 MMHG | HEIGHT: 73 IN | BODY MASS INDEX: 31.4 KG/M2 | TEMPERATURE: 97.8 F | DIASTOLIC BLOOD PRESSURE: 80 MMHG

## 2019-05-13 DIAGNOSIS — Z13.220 LIPID SCREENING: ICD-10-CM

## 2019-05-13 DIAGNOSIS — R73.01 IFG (IMPAIRED FASTING GLUCOSE): ICD-10-CM

## 2019-05-13 DIAGNOSIS — Z00.00 ROUTINE HISTORY AND PHYSICAL EXAMINATION OF ADULT: Primary | ICD-10-CM

## 2019-05-13 DIAGNOSIS — B35.1 ONYCHOMYCOSIS: ICD-10-CM

## 2019-05-13 DIAGNOSIS — L91.0 KELOID SCAR: ICD-10-CM

## 2019-05-13 LAB — HBA1C MFR BLD: 5.4 % (ref 0–5.6)

## 2019-05-13 PROCEDURE — 99395 PREV VISIT EST AGE 18-39: CPT | Performed by: INTERNAL MEDICINE

## 2019-05-13 PROCEDURE — 80061 LIPID PANEL: CPT | Performed by: INTERNAL MEDICINE

## 2019-05-13 PROCEDURE — 83036 HEMOGLOBIN GLYCOSYLATED A1C: CPT | Performed by: INTERNAL MEDICINE

## 2019-05-13 PROCEDURE — 36415 COLL VENOUS BLD VENIPUNCTURE: CPT | Performed by: INTERNAL MEDICINE

## 2019-05-13 RX ORDER — CICLOPIROX 80 MG/ML
SOLUTION TOPICAL
Qty: 6 ML | Refills: 3 | Status: SHIPPED | OUTPATIENT
Start: 2019-05-13 | End: 2020-10-02

## 2019-05-13 ASSESSMENT — MIFFLIN-ST. JEOR: SCORE: 2055.51

## 2019-05-13 NOTE — PROGRESS NOTES
SUBJECTIVE:   CC: Adan Adrian is an 33 year old male who presents for preventative health visit.       Healthy Habits:     Getting at least 3 servings of Calcium per day:  Yes    Bi-annual eye exam:  NO    Dental care twice a year:  Yes    Sleep apnea or symptoms of sleep apnea:  None    Diet:  Regular (no restrictions)    Frequency of exercise:  1 day/week    Duration of exercise:  15-30 minutes    Taking medications regularly:  Yes    Medication side effects:  None    PHQ-2 Total Score: 0    Additional concerns today:  No      Today's PHQ-2 Score:   PHQ-2 (  [employer]) 2019   Q1: Little interest or pleasure in doing things 0   Q2: Feeling down, depressed or hopeless 0   PHQ-2 Score 0   Q1: Little interest or pleasure in doing things -   Q2: Feeling down, depressed or hopeless -   PHQ-2 Score -     Abuse: Current or Past(Physical, Sexual or Emotional)- No  Do you feel safe in your environment? Yes    Social History     Tobacco Use     Smoking status: Never Smoker     Smokeless tobacco: Never Used   Substance Use Topics     Alcohol use: Yes     Alcohol/week: 0.0 oz     Comment: maybe every 2 weeks or a party      If you drink alcohol do you typically have >3 drinks per day or >7 drinks per week? No      Reviewed orders with patient. Reviewed health maintenance and updated orders accordingly - Yes      Reviewed and updated as needed this visit by clinical staff  Tobacco  Allergies  Meds  Problems  Med Hx  Surg Hx  Soc Hx        Reviewed and updated as needed this visit by Provider  Allergies  Meds  Problems  Med Hx  Surg Hx        History reviewed. No pertinent past medical history.    History reviewed. No pertinent surgical history.    Family History   Problem Relation Age of Onset     Diabetes Mother         Type 2     C.A.D. Father         MI age 55 ()     Diabetes Sister      Cancer - colorectal No family hx of      Prostate Cancer No family hx of        Social History     Tobacco Use      "Smoking status: Never Smoker     Smokeless tobacco: Never Used   Substance Use Topics     Alcohol use: Yes     Alcohol/week: 0.0 oz     Comment: maybe every 2 weeks or a party        Review of Systems   Constitutional: Negative for chills, fatigue and fever.   HENT: Negative for congestion, ear pain, hearing loss, sore throat and trouble swallowing.    Eyes: Negative for pain and visual disturbance.   Respiratory: Negative for cough and shortness of breath.    Cardiovascular: Negative for chest pain and palpitations.   Gastrointestinal: Negative for abdominal pain, blood in stool, constipation, diarrhea, nausea and vomiting.   Genitourinary: Negative for difficulty urinating and testicular pain.   Musculoskeletal: Negative for arthralgias, back pain, myalgias and neck pain.   Skin: Negative for rash.   Neurological: Negative for dizziness, light-headedness, numbness and headaches.   Psychiatric/Behavioral: Negative for dysphoric mood and sleep disturbance. The patient is not nervous/anxious.        OBJECTIVE:   /80 (BP Location: Left arm, Cuff Size: Adult Large)   Pulse 64   Temp 97.8  F (36.6  C) (Tympanic)   Ht 1.842 m (6' 0.5\")   Wt 107.5 kg (236 lb 14.4 oz)   SpO2 97%   BMI 31.69 kg/m        Physical Exam   Constitutional: He is oriented to person, place, and time. No distress.   HENT:   Right Ear: External ear normal.   Left Ear: External ear normal.   Mouth/Throat: Oropharynx is clear and moist.   Eyes: Pupils are equal, round, and reactive to light. Conjunctivae are normal.   Neck: No thyromegaly present.   Cardiovascular: Normal rate, regular rhythm and normal heart sounds.   Pulmonary/Chest: Effort normal and breath sounds normal. No respiratory distress.   Abdominal: Soft. There is no tenderness.   Genitourinary: No discharge found.   Musculoskeletal: Normal range of motion. He exhibits no edema or tenderness.   Lymphadenopathy:     He has no cervical adenopathy.   Neurological: He is alert and " "oriented to person, place, and time. Coordination normal.   Skin: No rash noted.   Psychiatric: He has a normal mood and affect.   Nursing note and vitals reviewed.      ASSESSMENT/PLAN:       ICD-10-CM    1. Routine history and physical examination of adult Z00.00    2. IFG (impaired fasting glucose) R73.01 Hemoglobin A1c   3. Keloid scar L91.0 DERMATOLOGY REFERRAL   4. Onychomycosis -- right great toenail B35.1 ciclopirox (PENLAC) 8 % external solution   5. Lipid screening Z13.220 Lipid panel reflex to direct LDL Fasting       Patient Instructions   Follow up with Podiatry for your persistent left foot pain.    Consult with Dermatology for your painful Keloid.    Maintain low fat/calorie diet and regular exercise.    Follow up yearly or as needed.      COUNSELING:   Reviewed preventive health counseling, as reflected in patient instructions    Estimated body mass index is 31.69 kg/m  as calculated from the following:    Height as of this encounter: 1.842 m (6' 0.5\").    Weight as of this encounter: 107.5 kg (236 lb 14.4 oz).     Weight management plan: Discussed healthy diet and exercise guidelines     reports that he has never smoked. He has never used smokeless tobacco.      Counseling Resources:  ATP IV Guidelines  Pooled Cohorts Equation Calculator  FRAX Risk Assessment  ICSI Preventive Guidelines  Dietary Guidelines for Americans, 2010  USDA's MyPlate  ASA Prophylaxis  Lung CA Screening    Toño Allred MD  [facility]  "

## 2019-05-13 NOTE — PATIENT INSTRUCTIONS
Follow up with Podiatry for your persistent left foot pain.    Consult with Dermatology for your painful Keloid.    Maintain low fat/calorie diet and regular exercise.    Follow up yearly or as needed.

## 2019-05-14 ENCOUNTER — TELEPHONE (OUTPATIENT)
Dept: FAMILY MEDICINE | Facility: CLINIC | Age: 34
End: 2019-05-14

## 2019-05-14 LAB
CHOLEST SERPL-MCNC: 197 MG/DL
HDLC SERPL-MCNC: 34 MG/DL
LDLC SERPL CALC-MCNC: 126 MG/DL
NONHDLC SERPL-MCNC: 163 MG/DL
TRIGL SERPL-MCNC: 183 MG/DL

## 2019-05-14 NOTE — TELEPHONE ENCOUNTER
Insurance not covering medication for treatment of onychomycosis. Patient has appointment on 05/16/19 with Podiatry and will discuss alternative options when he sees them.    Prasanna Santana CMA on 5/14/2019 at 3:08 PM

## 2019-05-16 ENCOUNTER — OFFICE VISIT (OUTPATIENT)
Dept: PODIATRY | Facility: CLINIC | Age: 34
End: 2019-05-16
Payer: COMMERCIAL

## 2019-05-16 VITALS
DIASTOLIC BLOOD PRESSURE: 78 MMHG | BODY MASS INDEX: 31.28 KG/M2 | HEIGHT: 73 IN | WEIGHT: 236 LBS | SYSTOLIC BLOOD PRESSURE: 128 MMHG

## 2019-05-16 DIAGNOSIS — M25.572 LEFT ANKLE PAIN, UNSPECIFIED CHRONICITY: ICD-10-CM

## 2019-05-16 DIAGNOSIS — Z02.6 ENCOUNTER RELATED TO WORKER'S COMPENSATION CLAIM: Primary | ICD-10-CM

## 2019-05-16 DIAGNOSIS — B35.1 ONYCHOMYCOSIS: ICD-10-CM

## 2019-05-16 PROCEDURE — 99214 OFFICE O/P EST MOD 30 MIN: CPT | Performed by: PODIATRIST

## 2019-05-16 RX ORDER — ECONAZOLE NITRATE 10 MG/G
CREAM TOPICAL
Qty: 85 G | Refills: 1 | Status: SHIPPED | OUTPATIENT
Start: 2019-05-16 | End: 2020-10-02

## 2019-05-16 ASSESSMENT — MIFFLIN-ST. JEOR: SCORE: 2051.43

## 2019-05-16 NOTE — PROGRESS NOTES
"Foot & Ankle Surgery   May 16, 2019    S:  Pt is seen today for evaluation of left lower extremity pain.  He was last seen Nov 2017 for a work-comp injury involving PT tendon and sinus tarsi pain.  These issues have improved but he's not having anterior ankle/dorsal foot pain.  He states this is worst after his Tuesday work day, where his foot gets stuck in an everted position.  His wife massages his foot and tries to straighten it.  It eventually does straighten but can be very painful.  His last brace broke, so he has a new one he's been wearing to work, and also has a boot at home.  After leaving the room, I was notified by rooming staff that he wanted something else for onychomycosis, as Penlac is not covered.    Vitals:    05/16/19 0930   BP: 128/78   Weight: 107 kg (236 lb)   Height: 1.842 m (6' 0.5\")   '      ROS - Pos for CC.  Patient denies current nausea, vomiting, chills, fevers, belly pain, calf pain, chest pain or SOB.  Complete remainder of ROS it otherwise neg.      PE:  Gen:   No apparent distress  Eye:    Visual scanning without deficit  Ear:    Response to auditory stimuli wnl  Lung:    Non-labored breathing on RA noted  Abd:    NTND per patient report  Lymph:    Neg for pitting/non-pitting edema BLE  Vasc:    Pulses palpable, CFT minimally delayed  Neuro:    Light touch sensation intact to all sensory nerve distributions without paresthesias  Derm:    Neg for nodules, lesions or ulcerations.  Subungual hematoma L 2nd nail without obvious onychomycosis L foot  MSK:    Left lower extremity - pronounced pes planus.  No medial ankle/PT tendon pain.  Unable to reproduce pain at sinus tarsi.  Points to dorsal foot near T-N joint but unable to elicit pain today. His foot is in a pronounced abducted alignment, and even with asking him to relax his foot, his peroneal tendons are taught.  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  Peroneal spastic flatfoot with dorsal foot pain left lower extremity in setting of previous left foot work-comp injury  -continue ankle brace and/or walking boot as pain levels dictate  -RICE/NSAID vs tylenol prn based on pain levels  -repeat L ankle MRI, will call with results  -consider orthotics and PT once symptoms have resolved if no structural damage is found on imaging  -briefly discussed surgery may be indicated if structural damage is seen.  I have been seeing him since 2016 for the work-comp injury to the left foot/ankle. However, this is a new location of pain not previously reported    2.  Onychomycosis   -penlac not covered  -Rx for econazole cream sent to his pharmacy     Follow up:  Prn based on imaging or sooner with acute issues      Body mass index is 31.57 kg/m .  Weight management plan: Patient was referred to their PCP to discuss a diet and exercise plan.         Prasanna Hinson DPM FACFAS FACFAOM  Podiatric Foot & Ankle Surgeon  [facility]  [phone]

## 2019-05-16 NOTE — PATIENT INSTRUCTIONS
Thank you for choosing [facility]!    DR. ALVAREZ'S CLINIC LOCATIONS:   MONDAY - [place] TUESDAY - [place]   [address] [facility]  [address]   [place] [address] [place] [address]   [phone]       THURSDAY AM - [place] THURSDAY PM - [place]   [address]   [place] [address] [place], [place] [address] [phone]       FRIDAY AM - [place] SET UP SURGERY: [phone] [address] APPOINTMENTS: [phone]   [place] [address] BILLING QUESTIONS: [phone] FAX NUMBER: [phone]     Follow Up:  Will call with MRI results        BODY WEIGHT AND YOUR FEET  The following information is included in the after visit summary for all patients. Body weight can be a sensitive issue to discuss in clinic, but we think the following information is very important. Although we focus on the feet and ankles, we do support the overall health of our patients.     Many things can cause foot and ankle problems. Foot structure, activity level, foot mechanics and injuries are common causes of pain. One very important issue that often goes unmentioned, is body weight. Extra weight can cause increased stress on muscles, ligaments, bones and tendons. Sometimes just a few extra pounds is all it takes to put one over her/his threshold. Without reducing that stress, it can be difficult to alleviate pain. As Foot & Ankle specialists, our job is addressing the lower extremity problem and possible causes. Regarding extra body weight, we encourage patients to discuss diet and weight management plans with their primary care doctors. It is this team approach that gives you the best opportunity for pain relief and getting you back on your feet.      [facility] has a Comprehensive Weight Management Program. This program includes counseling, education, non-surgical and surgical approaches to weight loss. If you  are interested in learning more either talk to you primary care provider or call [phone].

## 2019-05-21 ENCOUNTER — HOSPITAL ENCOUNTER (OUTPATIENT)
Dept: MRI IMAGING | Facility: CLINIC | Age: 34
Discharge: HOME | End: 2019-05-21
Attending: PODIATRIST | Admitting: PODIATRIST
Payer: COMMERCIAL

## 2019-05-21 DIAGNOSIS — Z02.6 ENCOUNTER RELATED TO WORKER'S COMPENSATION CLAIM: ICD-10-CM

## 2019-05-21 DIAGNOSIS — M25.572 LEFT ANKLE PAIN, UNSPECIFIED CHRONICITY: ICD-10-CM

## 2019-05-21 PROCEDURE — 73721 MRI JNT OF LWR EXTRE W/O DYE: CPT | Mod: LT

## 2019-05-21 ASSESSMENT — ENCOUNTER SYMPTOMS
BLOOD IN STOOL: 0
DIARRHEA: 0
HEADACHES: 0
COUGH: 0
VOMITING: 0
CONSTIPATION: 0
PALPITATIONS: 0
DYSPHORIC MOOD: 0
BACK PAIN: 0
SLEEP DISTURBANCE: 0
LIGHT-HEADEDNESS: 0
EYE PAIN: 0
FEVER: 0
SHORTNESS OF BREATH: 0
ARTHRALGIAS: 0
NECK PAIN: 0
SORE THROAT: 0
DIZZINESS: 0
NAUSEA: 0
NERVOUS/ANXIOUS: 0
TROUBLE SWALLOWING: 0
MYALGIAS: 0
CHILLS: 0
NUMBNESS: 0
DIFFICULTY URINATING: 0
ABDOMINAL PAIN: 0
FATIGUE: 0

## 2019-05-24 ENCOUNTER — TELEPHONE (OUTPATIENT)
Dept: PODIATRY | Facility: CLINIC | Age: 34
End: 2019-05-24

## 2019-05-24 NOTE — TELEPHONE ENCOUNTER
I called and spoke with Adan about his MRI results:    IMPRESSION:    1. Mild bone marrow edema in the superior calcaneus adjacent to the  posterior subtalar joint. There is also mild bone marrow edema/early  cystic resorptive changes surrounding the sinus Tarsi. These findings  are new since the prior study.  2. Moderate to prominent pes planus.    Advised follow up in clinic to try sinus tarsi diagnostic/therapeutic injection, as well as considering PT based on injection results.  We'll give him a letter excusing him from work that day as a back-up plan, in case his foot is sore after the injection.    All questions answered, patient happy with plan.    Prasanna Hinson DPM [facility] FACFAOM  Podiatric Foot & Ankle Surgeon  [facility]  [phone]

## 2019-05-28 NOTE — TELEPHONE ENCOUNTER
Patient stopped in clinic today to schedule appt for this Thursday to see   Dr. Hinson and I am not seeing any notes stating what time to add him on.  Please call pt to let him know what time to come this Thursday    Thank you  [phone]- Adan

## 2019-05-30 NOTE — TELEPHONE ENCOUNTER
PT called to follow up/ says Dr Maza wants him in today/ but no note per Dr Hinson/ please call pt to advise asap    Ph. [phone]  Ok to leave voicemail

## 2019-06-07 ENCOUNTER — OFFICE VISIT (OUTPATIENT)
Dept: PODIATRY | Facility: CLINIC | Age: 34
End: 2019-06-07
Payer: COMMERCIAL

## 2019-06-07 VITALS — WEIGHT: 236 LBS | BODY MASS INDEX: 31.28 KG/M2 | RESPIRATION RATE: 12 BRPM | HEIGHT: 73 IN

## 2019-06-07 DIAGNOSIS — M25.572 SINUS TARSI SYNDROME, LEFT: Primary | ICD-10-CM

## 2019-06-07 DIAGNOSIS — M21.42 PES PLANUS OF LEFT FOOT: ICD-10-CM

## 2019-06-07 PROCEDURE — 20600 DRAIN/INJ JOINT/BURSA W/O US: CPT | Mod: LT | Performed by: PODIATRIST

## 2019-06-07 RX ORDER — TRIAMCINOLONE ACETONIDE 40 MG/ML
40 INJECTION, SUSPENSION INTRA-ARTICULAR; INTRAMUSCULAR
Status: DISCONTINUED | OUTPATIENT
Start: 2019-06-07 | End: 2020-10-02

## 2019-06-07 RX ADMIN — TRIAMCINOLONE ACETONIDE 40 MG: 40 INJECTION, SUSPENSION INTRA-ARTICULAR; INTRAMUSCULAR at 10:46

## 2019-06-07 ASSESSMENT — MIFFLIN-ST. JEOR: SCORE: 2051.43

## 2019-06-07 NOTE — PROGRESS NOTES
"Foot & Ankle Surgery   June 7, 2019    S:  Pt is seen today for evaluation of left foot pain, to review the MRI and for the sinus tarsi diagnostic/therapeutic injection.  Pain continues to be pronounced at night, not as bad as during the day.  Having some medial L arch pain/bruising.    Vitals:    06/07/19 1017   Resp: 12   Weight: 107 kg (236 lb)   Height: 1.842 m (6' 0.5\")   '      ROS - Pos for CC.  Patient denies current nausea, vomiting, chills, fevers, belly pain, calf pain, chest pain or SOB.  Complete remainder of ROS it otherwise neg.      PE:  Gen:   No apparent distress  Eye:    Visual scanning without deficit  Ear:    Response to auditory stimuli wnl  Lung:    Non-labored breathing on RA noted  Abd:    NTND per patient report  Lymph:    Neg for pitting/non-pitting edema BLE  Vasc:    Pulses palpable, CFT minimally delayed  Neuro:    Light touch sensation intact to all sensory nerve distributions without paresthesias  Derm:    Neg for nodules, lesions or ulcerations  MSK:    Left lower extremity-  Pes planus, forefoot abduction, arch is reducible. Tender at sinus tarsi without obvious STJ pathology, and tender at medial arch near talar head/PT tendon course  Calf:    Neg for redness, swelling or tenderness      Imaging:  MR SANABRIA ankle 5/21/19 - IMPRESSION:    1. Mild bone marrow edema in the superior calcaneus adjacent to the  posterior subtalar joint. There is also mild bone marrow edema/early  cystic resorptive changes surrounding the sinus Tarsi. These findings  are new since the prior study.  2. Moderate to prominent pes planus.    Assessment:  33 year old male with sinus tarsi and medial ankle pain left lower extremity in setting of pes planus/forefoot abduction      Plan:  Discussed etiologies, anatomy and options  1.  sinus tarsi and medial ankle pain left lower extremity in setting of pes planus/forefoot abduction  -continue comfortable shoe gear and inserts/orthotics  -RICE/NSAID vs tylenol prn " based on pain levels  -personally reviewedimaging - calcaneal inflammation near STJ, does not correlate with symptoms so no intervention indicated currently.  Consider PO steroid vs boot if becomes symptoamtic  -diagnostic/therapeutic steroid injection sinus tarsi, see procedure note. Patient advised to monitor %, location, duration of improvement    Small Joint Injection/Arthrocentesis  Date/Time: 6/7/2019 10:46 AM  Performed by: Prasanna Hinson DPM  Authorized by: Prasanna Hinson DPM     Needle Size:  27 G  Guidance: landmark     Approach:  Lateral  Location:  Foot                      Medications:  40 mg triamcinolone 40 MG/ML        Outcome:  Tolerated well, no immediate complications    Consent Given by:  Patient         After obtaining written consent, the joint was identified and the skin was prepped with alcohol and betadine.  The joint was distracted and the needle was advance to the underlying sinus tarsi.  1 1/2cc mixture of 2:1 kenalog 40:1% lidocaine plain was injected.  The patient tolerated the procedure without complication.  Risks that were discussed included possible joint/cartilage damage, infection, pigment change, steroid flare.               Follow up:  Prn based on injection results or sooner with acute issues      Body mass index is 31.57 kg/m .  Weight management plan: Patient was referred to their PCP to discuss a diet and exercise plan.         Prasanna Hinson DPM FACFAS FACFAOM  Podiatric Foot & Ankle Surgeon  [facility]  [phone]

## 2019-06-07 NOTE — LETTER
"    6/7/2019         RE: Adan Adrian  [address]  [facility] [address]        Dear Colleague,    Thank you for referring your patient, Adan Adrian, to the [facility]. Please see a copy of my visit note below.    Foot & Ankle Surgery   June 7, 2019    S:  Pt is seen today for evaluation of left foot pain, to review the MRI and for the sinus tarsi diagnostic/therapeutic injection.  Pain continues to be pronounced at night, not as bad as during the day.  Having some medial L arch pain/bruising.    Vitals:    06/07/19 1017   Resp: 12   Weight: 107 kg (236 lb)   Height: 1.842 m (6' 0.5\")   '      ROS - Pos for CC.  Patient denies current nausea, vomiting, chills, fevers, belly pain, calf pain, chest pain or SOB.  Complete remainder of ROS it otherwise neg.      PE:  Gen:   No apparent distress  Eye:    Visual scanning without deficit  Ear:    Response to auditory stimuli wnl  Lung:    Non-labored breathing on RA noted  Abd:    NTND per patient report  Lymph:    Neg for pitting/non-pitting edema BLE  Vasc:    Pulses palpable, CFT minimally delayed  Neuro:    Light touch sensation intact to all sensory nerve distributions without paresthesias  Derm:    Neg for nodules, lesions or ulcerations  MSK:    Left lower extremity-  Pes planus, forefoot abduction, arch is reducible. Tender at sinus tarsi without obvious STJ pathology, and tender at medial arch near talar head/PT tendon course  Calf:    Neg for redness, swelling or tenderness      Imaging:  MR SANABRIA ankle 5/21/19 - IMPRESSION:    1. Mild bone marrow edema in the superior calcaneus adjacent to the  posterior subtalar joint. There is also mild bone marrow edema/early  cystic resorptive changes surrounding the sinus Tarsi. These findings  are new since the prior study.  2. Moderate to prominent pes planus.    Assessment:  33 year old male with sinus tarsi and medial ankle pain left lower extremity in setting of pes planus/forefoot " abduction      Plan:  Discussed etiologies, anatomy and options  1.  sinus tarsi and medial ankle pain left lower extremity in setting of pes planus/forefoot abduction  -continue comfortable shoe gear and inserts/orthotics  -RICE/NSAID vs tylenol prn based on pain levels  -personally reviewedimaging - calcaneal inflammation near STJ, does not correlate with symptoms so no intervention indicated currently.  Consider PO steroid vs boot if becomes symptoamtic  -diagnostic/therapeutic steroid injection sinus tarsi, see procedure note. Patient advised to monitor %, location, duration of improvement    Small Joint Injection/Arthrocentesis  Date/Time: 6/7/2019 10:46 AM  Performed by: Prasanna Hinson DPM  Authorized by: Prasanna Hinson DPM     Needle Size:  27 G  Guidance: landmark     Approach:  Lateral  Location:  Foot                      Medications:  40 mg triamcinolone 40 MG/ML        Outcome:  Tolerated well, no immediate complications    Consent Given by:  Patient         After obtaining written consent, the joint was identified and the skin was prepped with alcohol and betadine.  The joint was distracted and the needle was advance to the underlying sinus tarsi.  1 1/2cc mixture of 2:1 kenalog 40:1% lidocaine plain was injected.  The patient tolerated the procedure without complication.  Risks that were discussed included possible joint/cartilage damage, infection, pigment change, steroid flare.               Follow up:  *** or sooner with acute issues      Body mass index is 31.57 kg/m .  Weight management plan: Patient was referred to their PCP to discuss a diet and exercise plan.         Prasanna Hinson DPM FACFAS FACFAOM  Podiatric Foot & Ankle Surgeon  [facility]  [phone]    Again, thank you for allowing me to participate in the care of your patient.        Sincerely,        Prasanna Hinson DPM, JOSÉ MIGUEL

## 2019-06-17 ENCOUNTER — MYC MEDICAL ADVICE (OUTPATIENT)
Dept: PODIATRY | Facility: CLINIC | Age: 34
End: 2019-06-17

## 2019-07-12 ENCOUNTER — TRANSFERRED RECORDS (OUTPATIENT)
Dept: HEALTH INFORMATION MANAGEMENT | Facility: CLINIC | Age: 34
End: 2019-07-12

## 2020-03-01 ENCOUNTER — HEALTH MAINTENANCE LETTER (OUTPATIENT)
Age: 35
End: 2020-03-01

## 2020-10-02 ENCOUNTER — NURSE TRIAGE (OUTPATIENT)
Dept: NURSING | Facility: CLINIC | Age: 35
End: 2020-10-02

## 2020-10-02 ENCOUNTER — OFFICE VISIT (OUTPATIENT)
Dept: FAMILY MEDICINE | Facility: CLINIC | Age: 35
End: 2020-10-02

## 2020-10-02 VITALS
TEMPERATURE: 96.4 F | OXYGEN SATURATION: 99 % | WEIGHT: 235.2 LBS | SYSTOLIC BLOOD PRESSURE: 130 MMHG | HEART RATE: 54 BPM | DIASTOLIC BLOOD PRESSURE: 80 MMHG | BODY MASS INDEX: 31.46 KG/M2

## 2020-10-02 DIAGNOSIS — H60.392 OTHER INFECTIVE ACUTE OTITIS EXTERNA OF LEFT EAR: Primary | ICD-10-CM

## 2020-10-02 PROCEDURE — 99213 OFFICE O/P EST LOW 20 MIN: CPT | Performed by: NURSE PRACTITIONER

## 2020-10-02 RX ORDER — CIPROFLOXACIN 0.5 MG/.25ML
0.25 SOLUTION/ DROPS AURICULAR (OTIC) 2 TIMES DAILY
Qty: 1 EACH | Refills: 0 | Status: SHIPPED | OUTPATIENT
Start: 2020-10-02 | End: 2022-06-16

## 2020-10-02 NOTE — PROGRESS NOTES
Subjective     Adan Adrian is a 35 year old male who presents to clinic today for the following health issues:    HPI         Pain in left ear ongoing for 2 days. Pain has woken patient up in the middle of the night but was resolved slightly by taking OTC medications  Taking aspirin helped a little bit  Denies fever and chilling  No sore throat or sinus congestion  No sneezing or itchy eyes    Review of Systems   Constitutional, HEENT, cardiovascular, pulmonary, gi and gu systems are negative, except as otherwise noted.      Objective    /80 (BP Location: Right arm, Patient Position: Sitting, Cuff Size: Adult Regular)   Pulse 54   Temp 96.4  F (35.8  C) (Temporal)   Wt 106.7 kg (235 lb 3.2 oz)   SpO2 99%   BMI 31.46 kg/m      GENERAL: healthy, alert and mild distress  EYES: Eyes grossly normal to inspection, PERRL and conjunctivae and sclerae normal  HENT: left ear canal inflamed and erythematous without exudate, tenderness of preauricular and post auricular lymph nodes, no mastoid tendernss, right canal normal;  Both TMs have siginificant scarring but no erythema or bulging, nose and mouth without ulcers or lesions  NECK: no adenopathy, no asymmetry, masses, or scars and thyroid normal to palpation  RESP: lungs clear to auscultation - no rales, rhonchi or wheezes  CV: regular rate and rhythm, normal S1 S2, no S3 or S4, no murmur, click or rub, no peripheral edema and peripheral pulses strong  SKIN: no suspicious lesions or rashes  PSYCH: mentation appears normal, affect normal/bright    Assessment & Plan     Other infective acute otitis externa of left ear    - ciprofloxacin (CETRAXAL) 0.2 % otic solution  Dispense: 1 each; Refill: 0      Patient Instructions   Place 0.25 ml in the left ear twice a day  Place cotton ball in opening of canal to keep the medication inside the canal  If not gone or if worsening please contact me at the clinic  Take aleve 220 mg 1 or 2 pills twice a day for pain  May also  use heating pad for comfort        FARRUKH Greene CNP  [facility]

## 2020-10-02 NOTE — PATIENT INSTRUCTIONS
Place 0.25 ml in the left ear twice a day  Place cotton ball in opening of canal to keep the medication inside the canal  If not gone or if worsening please contact me at the clinic  Take aleve 220 mg 1 or 2 pills twice a day for pain  May also use heating pad for comfort

## 2020-10-03 NOTE — TELEPHONE ENCOUNTER
S: Ear drop .  B: Anthony pharmacist calling from [facility] on [address]  Patient was at [facility] tonight was DX with ear infection was e-prescribed an ear drop  [facility] does not stock.   A: Pharmacist wondering about making a slight change from 0.2% to 0.3% Ciprofloxacin.  R: Writer paged on call provider Dr. Vitale to call pharmacist.  Loretta Mendez RN, [facility]       Reason for Disposition    Pharmacy calling with prescription questions and triager unable to answer question    Protocols used: MEDICATION QUESTION CALL-A-

## 2020-12-14 ENCOUNTER — HEALTH MAINTENANCE LETTER (OUTPATIENT)
Age: 35
End: 2020-12-14

## 2021-10-02 ENCOUNTER — HEALTH MAINTENANCE LETTER (OUTPATIENT)
Age: 36
End: 2021-10-02

## 2022-01-22 ENCOUNTER — HEALTH MAINTENANCE LETTER (OUTPATIENT)
Age: 37
End: 2022-01-22

## 2022-06-15 ASSESSMENT — ENCOUNTER SYMPTOMS
ABDOMINAL PAIN: 0
SORE THROAT: 0
SHORTNESS OF BREATH: 0
PALPITATIONS: 0
FEVER: 0
HEARTBURN: 0
JOINT SWELLING: 0
NERVOUS/ANXIOUS: 0
HEMATOCHEZIA: 0
EYE PAIN: 0
CONSTIPATION: 0
WEAKNESS: 0
DIARRHEA: 0
ARTHRALGIAS: 0
DIZZINESS: 0
COUGH: 0
MYALGIAS: 0
NAUSEA: 0
CHILLS: 0
HEMATURIA: 0
HEADACHES: 0
FREQUENCY: 0
PARESTHESIAS: 0
DYSURIA: 0

## 2022-06-16 ENCOUNTER — OFFICE VISIT (OUTPATIENT)
Dept: FAMILY MEDICINE | Facility: CLINIC | Age: 37
End: 2022-06-16
Payer: COMMERCIAL

## 2022-06-16 VITALS
RESPIRATION RATE: 20 BRPM | WEIGHT: 250 LBS | SYSTOLIC BLOOD PRESSURE: 130 MMHG | HEART RATE: 66 BPM | HEIGHT: 73 IN | OXYGEN SATURATION: 98 % | BODY MASS INDEX: 33.13 KG/M2 | TEMPERATURE: 96.9 F | DIASTOLIC BLOOD PRESSURE: 80 MMHG

## 2022-06-16 DIAGNOSIS — Z13.1 SCREENING FOR DIABETES MELLITUS: ICD-10-CM

## 2022-06-16 DIAGNOSIS — Z13.6 CARDIOVASCULAR SCREENING; LDL GOAL LESS THAN 160: ICD-10-CM

## 2022-06-16 DIAGNOSIS — L91.0 KELOID SCAR: ICD-10-CM

## 2022-06-16 DIAGNOSIS — Z00.00 ROUTINE GENERAL MEDICAL EXAMINATION AT A HEALTH CARE FACILITY: Primary | ICD-10-CM

## 2022-06-16 DIAGNOSIS — Z11.59 NEED FOR HEPATITIS C SCREENING TEST: ICD-10-CM

## 2022-06-16 DIAGNOSIS — Z23 NEED FOR TDAP VACCINATION: ICD-10-CM

## 2022-06-16 LAB
ALBUMIN SERPL-MCNC: 4 G/DL (ref 3.4–5)
ALP SERPL-CCNC: 116 U/L (ref 40–150)
ALT SERPL W P-5'-P-CCNC: 62 U/L (ref 0–70)
ANION GAP SERPL CALCULATED.3IONS-SCNC: 7 MMOL/L (ref 3–14)
AST SERPL W P-5'-P-CCNC: 28 U/L (ref 0–45)
BILIRUB SERPL-MCNC: 0.5 MG/DL (ref 0.2–1.3)
BUN SERPL-MCNC: 10 MG/DL (ref 7–30)
CALCIUM SERPL-MCNC: 8.8 MG/DL (ref 8.5–10.1)
CHLORIDE BLD-SCNC: 105 MMOL/L (ref 94–109)
CHOLEST SERPL-MCNC: 210 MG/DL
CO2 SERPL-SCNC: 25 MMOL/L (ref 20–32)
CREAT SERPL-MCNC: 0.67 MG/DL (ref 0.66–1.25)
FASTING STATUS PATIENT QL REPORTED: YES
GFR SERPL CREATININE-BSD FRML MDRD: >90 ML/MIN/1.73M2
GLUCOSE BLD-MCNC: 97 MG/DL (ref 70–99)
HBA1C MFR BLD: 5.8 % (ref 0–5.6)
HDLC SERPL-MCNC: 29 MG/DL
LDLC SERPL CALC-MCNC: 137 MG/DL
NONHDLC SERPL-MCNC: 181 MG/DL
POTASSIUM BLD-SCNC: 4.1 MMOL/L (ref 3.4–5.3)
PROT SERPL-MCNC: 7.8 G/DL (ref 6.8–8.8)
SODIUM SERPL-SCNC: 137 MMOL/L (ref 133–144)
TRIGL SERPL-MCNC: 218 MG/DL

## 2022-06-16 PROCEDURE — 90471 IMMUNIZATION ADMIN: CPT | Performed by: FAMILY MEDICINE

## 2022-06-16 PROCEDURE — 83036 HEMOGLOBIN GLYCOSYLATED A1C: CPT | Performed by: FAMILY MEDICINE

## 2022-06-16 PROCEDURE — 80053 COMPREHEN METABOLIC PANEL: CPT | Performed by: FAMILY MEDICINE

## 2022-06-16 PROCEDURE — 36415 COLL VENOUS BLD VENIPUNCTURE: CPT | Performed by: FAMILY MEDICINE

## 2022-06-16 PROCEDURE — 80061 LIPID PANEL: CPT | Performed by: FAMILY MEDICINE

## 2022-06-16 PROCEDURE — 90715 TDAP VACCINE 7 YRS/> IM: CPT | Performed by: FAMILY MEDICINE

## 2022-06-16 PROCEDURE — 86803 HEPATITIS C AB TEST: CPT | Performed by: FAMILY MEDICINE

## 2022-06-16 PROCEDURE — 99395 PREV VISIT EST AGE 18-39: CPT | Mod: 25 | Performed by: FAMILY MEDICINE

## 2022-06-16 ASSESSMENT — ENCOUNTER SYMPTOMS
PALPITATIONS: 0
NERVOUS/ANXIOUS: 0
HEMATURIA: 0
HEARTBURN: 0
EYE PAIN: 0
CONSTIPATION: 0
WEAKNESS: 0
DIZZINESS: 0
FREQUENCY: 0
SHORTNESS OF BREATH: 0
SORE THROAT: 0
HEMATOCHEZIA: 0
FEVER: 0
HEADACHES: 0
PARESTHESIAS: 0
JOINT SWELLING: 0
MYALGIAS: 0
NAUSEA: 0
DIARRHEA: 0
COUGH: 0
DYSURIA: 0
ARTHRALGIAS: 0
CHILLS: 0
ABDOMINAL PAIN: 0

## 2022-06-16 NOTE — PROGRESS NOTES
SUBJECTIVE:   CC: Adan Adrian is an 37 year old male who presents for preventative health visit.     {  Patient has been advised of split billing requirements and indicates understanding: Yes     Healthy Habits:     Getting at least 3 servings of Calcium per day:  Yes    Bi-annual eye exam:  NO    Dental care twice a year:  Yes    Sleep apnea or symptoms of sleep apnea:  None    Diet:  Regular (no restrictions)    Frequency of exercise:  1 day/week    Duration of exercise:  15-30 minutes    Taking medications regularly:  Yes    Medication side effects:  Not applicable and None    PHQ-2 Total Score: 0    Additional concerns today:  No      Today's PHQ-2 Score:   PHQ-2 ( 1999 Pfizer) 6/15/2022   Q1: Little interest or pleasure in doing things 0   Q2: Feeling down, depressed or hopeless 0   PHQ-2 Score 0   PHQ-2 Total Score (12-17 Years)- Positive if 3 or more points; Administer PHQ-A if positive -   Q1: Little interest or pleasure in doing things Not at all   Q2: Feeling down, depressed or hopeless Not at all   PHQ-2 Score 0       Abuse: Current or Past(Physical, Sexual or Emotional)- No  Do you feel safe in your environment? Yes    Have you ever done Advance Care Planning? (For example, a Health Directive, POLST, or a discussion with a medical provider or your loved ones about your wishes): No, advance care planning information given to patient to review.  Patient declined advance care planning discussion at this time.    Social History     Tobacco Use     Smoking status: Never Smoker     Smokeless tobacco: Never Used   Substance Use Topics     Alcohol use: Yes     Comment: maybe every 2 weeks or a party        Alcohol Use 6/15/2022   Prescreen: >3 drinks/day or >7 drinks/week? No   Prescreen: >3 drinks/day or >7 drinks/week? -     Last PSA: No results found for: PSA    Reviewed orders with patient. Reviewed health maintenance and updated orders accordingly - Yes  Lab work is in process    Reviewed and updated as  "needed this visit by clinical staff   Tobacco  Allergies  Meds   Med Hx  Surg Hx  Fam Hx  Soc Hx          Reviewed and updated as needed this visit by Provider        Surg Hx  Fam Hx           History reviewed. No pertinent past medical history.   History reviewed. No pertinent surgical history.    Review of Systems   Constitutional: Negative for chills and fever.   HENT: Negative for congestion, ear pain, hearing loss and sore throat.    Eyes: Negative for pain and visual disturbance.   Respiratory: Negative for cough and shortness of breath.    Cardiovascular: Negative for chest pain, palpitations and peripheral edema.   Gastrointestinal: Negative for abdominal pain, constipation, diarrhea, heartburn, hematochezia and nausea.   Genitourinary: Negative for dysuria, frequency, genital sores, hematuria, impotence, penile discharge and urgency.   Musculoskeletal: Negative for arthralgias, joint swelling and myalgias.   Skin: Negative for rash.   Neurological: Negative for dizziness, weakness, headaches and paresthesias.   Psychiatric/Behavioral: Negative for mood changes. The patient is not nervous/anxious.        OBJECTIVE:   /80   Pulse 66   Temp 96.9  F (36.1  C) (Tympanic)   Resp 20   Ht 1.842 m (6' 0.5\")   Wt 113.4 kg (250 lb)   SpO2 98%   BMI 33.44 kg/m      Physical Exam  GENERAL: healthy, alert and no distress  EYES: Eyes grossly normal to inspection, PERRL and conjunctivae and sclerae normal  NECK: no adenopathy and no asymmetry, masses, or scars  RESP: lungs clear to auscultation - no rales, rhonchi or wheezes  CV: regular rates and rhythm, normal S1 S2, no S3 or S4 and no murmur, click or rub  MS: no gross musculoskeletal defects noted, no edema  SKIN: no suspicious lesions or rashes; keloid scar middle of chest and lateral left neck  PSYCH: mentation appears normal, affect normal/bright    Diagnostic Test Results:  Labs reviewed in Epic    ASSESSMENT/PLAN:   1. Routine general medical " "examination at a health care facility: health maintenance reviewed and updated. Encouraged regular activity, 150 minutes per week, balanced diet, work on weight loss.    2. Screening for diabetes mellitus  - Comprehensive metabolic panel (BMP + Alb, Alk Phos, ALT, AST, Total. Bili, TP); Future  - Hemoglobin A1c; Future  - Comprehensive metabolic panel (BMP + Alb, Alk Phos, ALT, AST, Total. Bili, TP)  - Hemoglobin A1c    3. CARDIOVASCULAR SCREENING; LDL GOAL LESS THAN 160  - Lipid panel reflex to direct LDL Fasting; Future  - Lipid panel reflex to direct LDL Fasting    4. Need for hepatitis C screening test  - Hepatitis C Screen Reflex to HCV RNA Quant and Genotype; Future  - Hepatitis C Screen Reflex to HCV RNA Quant and Genotype    5. Need for Tdap vaccination  - TDAP VACCINE (Adacel, Boostrix)    6. Keloid scar  - Adult Dermatology Referral; Future          COUNSELING:   Reviewed preventive health counseling, as reflected in patient instructions    Estimated body mass index is 33.44 kg/m  as calculated from the following:    Height as of this encounter: 1.842 m (6' 0.5\").    Weight as of this encounter: 113.4 kg (250 lb).     Weight management plan: Discussed healthy diet and exercise guidelines    He reports that he has never smoked. He has never used smokeless tobacco.      Counseling Resources:  ATP IV Guidelines  Pooled Cohorts Equation Calculator  FRAX Risk Assessment  ICSI Preventive Guidelines  Dietary Guidelines for Americans, 2010  USDA's MyPlate  ASA Prophylaxis  Lung CA Screening    Gary Solorzano DO  [facility] [place]  "

## 2022-06-17 LAB — HCV AB SERPL QL IA: NONREACTIVE

## 2022-06-29 ENCOUNTER — OFFICE VISIT (OUTPATIENT)
Dept: DERMATOLOGY | Facility: CLINIC | Age: 37
End: 2022-06-29
Attending: FAMILY MEDICINE
Payer: COMMERCIAL

## 2022-06-29 DIAGNOSIS — L91.0 KELOID SCAR: Primary | ICD-10-CM

## 2022-06-29 DIAGNOSIS — L91.8 INFLAMED SKIN TAG: ICD-10-CM

## 2022-06-29 PROCEDURE — 11200 RMVL SKIN TAGS UP TO&INC 15: CPT | Performed by: DERMATOLOGY

## 2022-06-29 PROCEDURE — 11900 INJECT SKIN LESIONS </W 7: CPT | Mod: 59 | Performed by: DERMATOLOGY

## 2022-06-29 PROCEDURE — 11201 RMVL SKIN TAGS EA ADDL 10: CPT | Performed by: DERMATOLOGY

## 2022-06-29 RX ORDER — TRIAMCINOLONE ACETONIDE 40 MG/ML
40 INJECTION, SUSPENSION INTRA-ARTICULAR; INTRAMUSCULAR ONCE
Status: DISCONTINUED | OUTPATIENT
Start: 2022-06-29 | End: 2024-09-19

## 2022-06-29 ASSESSMENT — PAIN SCALES - GENERAL: PAINLEVEL: NO PAIN (0)

## 2022-06-29 NOTE — NURSING NOTE
Dermatology Rooming Note    Adan Adrian's goals for this visit include:   Chief Complaint   Patient presents with     Keloid     Adan is here today for keloid scar on his chest.      Derm Problem     Spot of concern on L neck     Jennifer Ramsey, EMT

## 2022-06-29 NOTE — NURSING NOTE
Drug Administration Record    Prior to injection, verified patient identity using patient's name and date of birth.  Due to injection administration, patient instructed to remain in clinic for 15 minutes  afterwards, and to report any adverse reaction to me immediately.    Drug Name: triamcinolone acetonide(kenalog)  Dose: 1mL of triamcinolone 40mg/mL, 40mg dose  Route administered: ID  NDC #: Kenalog-40 ([address]-8)  Amount of waste(mL):0mL  Reason for waste: Multi dose vial    LOT #: [id number]  SITE: see provider  : [employer]  EXPIRATION DATE: 03/2024

## 2022-06-29 NOTE — LETTER
6/29/2022       RE: Adan Adrain  [address]  [facility] [address]     Dear Colleague,    Thank you for referring your patient, Adan Adrian, to the [facility] D[facility]OLOG[facility] [place] at [facility]. Please see a copy of my visit note below.    [facility] Dermatology Note  Encounter Date: Jun 29, 2022  Office Visit     Dermatology Problem List:  # Keloids, central chest and left neck s/p ILK 6/29/2022  - Future considerations: laser treatment, 5-FU injections  # Inflamed acrochordons on the neck s/p cryo  ____________________________________________    Assessment & Plan:    # Keloids, central chest and left neck.  Patient has had 2 steroid injections in the past. Recommended high-dose steroid injection today. If not improved, can consider laser treatment or 5-FU injections in the future.  - ILK today, see procedure note below  - Future considerations: laser treatment or 5-FU injections with Dr. Steiner    # Inflamed acrochordons, left neck x10 and right neck x8  Will proceed with cryotherapy due to itchy, bothersome nature.   - Cryotherapy today, see procedure note below    Procedures Performed:   - Intra-lesional triamcinolone procedure note. After verbal consent and review of risk of pain and skin thinning/atrophy, positioning and cleansing with isopropyl alcohol, 1 total mL of triamcinolone 40 mg/mL was injected into 18 skin tag(s) on the chest and left neck. The patient tolerated the procedure well and left the dermatology clinic in good condition.    - Cryotherapy procedure note, location(s): Neck. After verbal consent and discussion of risks and benefits including, but not limited to, dyspigmentation/scar, blister, and pain, 18 skin tag(s) was(were) treated with 1-2 mm freeze border for 1-2 cycles with liquid nitrogen. Post cryotherapy instructions were provided.    Follow-up: 4 week(s) in-person, or earlier for  new or changing lesions    Staff and Scribe:     Scribe Disclosure:  I, Micki Anderson, am serving as a scribe to document services personally performed by Peter Adame MD based on data collection and the provider's statements to me.     Provider Disclosure:   The documentation recorded by the scribe accurately reflects the services I personally performed and the decisions made by me.    Peter Adame MD    Department of Dermatology  [facility]: Phone: [phone], Fax:[phone]  [facility]: Phone: [phone] Fax: [phone]  ____________________________________________    CC: Keloid (Adan is here today for keloid scar on his chest. ) and Derm Problem (Spot of concern on L neck)    HPI:  Mr. Adan Adrian is a(n) 37 year old male who presents today as a new patient for spot check. The patient reports he had 2 steroid injections for keloid scar on his chest but still has scarring on his chest as well as his left neck. Additionally, he complains of some itchy skin tags on his neck.     Patient is otherwise feeling well, without additional skin concerns.    Labs Reviewed:  N/A    Physical Exam:  Vitals: There were no vitals taken for this visit.  SKIN: Focused examination of the chest and neck was performed.  - Pink linear keloidal plaques on the central chest and left neck.  - There is(are) skin colored pedunculated papules with erythema on the left neck x10 and right neck x8.   - No other lesions of concern on areas examined.     Medications:  No current outpatient medications on file.     No current facility-administered medications for this visit.      Past Medical History:   Patient Active Problem List   Diagnosis     CARDIOVASCULAR SCREENING; LDL GOAL LESS THAN 160     Low HDL (under 40)     IFG (impaired fasting glucose)     Back pain     Left ankle pain     No past medical  history on file.     CC Gary Solorzano, DO  [address] [place] [address] on close of this encounter.

## 2022-06-29 NOTE — PROGRESS NOTES
[facility] Dermatology Note  Encounter Date: Jun 29, 2022  Office Visit     Dermatology Problem List:  # Keloids, central chest and left neck s/p ILK 6/29/2022  - Future considerations: laser treatment, 5-FU injections  # Inflamed acrochordons on the neck s/p cryo  ____________________________________________    Assessment & Plan:    # Keloids, central chest and left neck.  Patient has had 2 steroid injections in the past. Recommended high-dose steroid injection today. If not improved, can consider laser treatment or 5-FU injections in the future.  - ILK today, see procedure note below  - Future considerations: laser treatment or 5-FU injections with Dr. Steiner    # Inflamed acrochordons, left neck x10 and right neck x8  Will proceed with cryotherapy due to itchy, bothersome nature.   - Cryotherapy today, see procedure note below    Procedures Performed:   - Intra-lesional triamcinolone procedure note. After verbal consent and review of risk of pain and skin thinning/atrophy, positioning and cleansing with isopropyl alcohol, 1 total mL of triamcinolone 40 mg/mL was injected into 2 keloids on the chest and left neck. The patient tolerated the procedure well and left the dermatology clinic in good condition.    - Cryotherapy procedure note, location(s): Neck. After verbal consent and discussion of risks and benefits including, but not limited to, dyspigmentation/scar, blister, and pain, 18 skin tag(s) on the left neck and right neck was(were) treated with 1-2 mm freeze border for 1-2 cycles with liquid nitrogen. Post cryotherapy instructions were provided.    Follow-up: 4 week(s) in-person, or earlier for new or changing lesions    Staff and Scribe:     Scribe Disclosure:  I, Micki Anderson, am serving as a scribe to document services personally performed by Peter Adame MD based on data collection and the provider's statements to me.     Provider Disclosure:   The documentation recorded by the  rahul accurately reflects the services I personally performed and the decisions made by me.    Peter Adame MD    Department of Dermatology  [facility]: Phone: [phone], Fax:[phone]  [facility]: Phone: [phone] Fax: [phone]  ____________________________________________    CC: Keloid (Adan is here today for keloid scar on his chest. ) and Derm Problem (Spot of concern on L neck)    HPI:  Mr. Adan Adrian is a(n) 37 year old male who presents today as a new patient for spot check. The patient reports he had 2 steroid injections for keloid scar on his chest but still has scarring on his chest as well as his left neck. Additionally, he complains of some itchy skin tags on his neck.     Patient is otherwise feeling well, without additional skin concerns.    Labs Reviewed:  N/A    Physical Exam:  Vitals: There were no vitals taken for this visit.  SKIN: Focused examination of the chest and neck was performed.  - Pink linear keloidal plaques on the central chest and left neck.  - There is(are) skin colored pedunculated papules with erythema on the left neck x10 and right neck x8.   - No other lesions of concern on areas examined.     Medications:  No current outpatient medications on file.     No current facility-administered medications for this visit.      Past Medical History:   Patient Active Problem List   Diagnosis     CARDIOVASCULAR SCREENING; LDL GOAL LESS THAN 160     Low HDL (under 40)     IFG (impaired fasting glucose)     Back pain     Left ankle pain     No past medical history on file.     CC Gary Solorzano,   [address] [place] [phone] on close of this encounter.

## 2022-08-10 ENCOUNTER — OFFICE VISIT (OUTPATIENT)
Dept: DERMATOLOGY | Facility: CLINIC | Age: 37
End: 2022-08-10
Payer: COMMERCIAL

## 2022-08-10 DIAGNOSIS — L91.0 KELOID: Primary | ICD-10-CM

## 2022-08-10 PROCEDURE — 11900 INJECT SKIN LESIONS </W 7: CPT | Performed by: DERMATOLOGY

## 2022-08-10 RX ORDER — TRIAMCINOLONE ACETONIDE 40 MG/ML
32 INJECTION, SUSPENSION INTRA-ARTICULAR; INTRAMUSCULAR ONCE
Status: DISCONTINUED | OUTPATIENT
Start: 2022-08-10 | End: 2024-09-19

## 2022-08-10 ASSESSMENT — PAIN SCALES - GENERAL: PAINLEVEL: NO PAIN (0)

## 2022-08-10 NOTE — PATIENT INSTRUCTIONS
Pulsed Dye Laser (PDL) Pre visit:    Risks of the procedure: I will experience redness, swelling, pain, and heat sensation. I may experience bruising, itching, or acne. Risks are blistering, oozing, permanent scarring, hair loss, temporary or permanent skin lightening or darkening, infection, and eye injury. I understand my outcome could be no improvement, slight improvement. Multiple treatments may be required.    Before the treatment:  Do not come tan  Come without make-up and creams on the face  Expect to spend up to 90 minutes in the building  Plan to be red and slightly swollen for 1-2 days with bruising for up to 1 week    After treatment, Do Not:  Rub, scratch, or put weight on the site for 2 weeks  Wear tight fitting clothing or jewelry over the site  Hopkins. Keep the site out of sunlight. Use sunscreen of 30 SPF or greater when in the sun. Use sunscreen 30 minutes before going out and reapply if sweating. Tanning decreases the success of the treatment    How do I care for the treated site?  Use ice packs for 10-20 minutes after the procedure for swelling   If the site is on your face, use ice again 1 hour after treatment for ten minutes and repeat again before bed. Do not burn the skin with the ice.   If a scab or crust forms, gently cleanse the site with water. Then put on Vaseline  ointment 3 times a day and contact the clinic   If a blister forms, contact the clinic by phone immediately  If you have concerns about swelling, call the clinic  Avoid sun exposure and do not get tan as this can darken the treated area, use sunscreen  Do not use makeup on any open wound  Do not come to your next treatment with a tan    What should I expect?  Mild swelling  Blue-purple color that may take 2 to 3 weeks to go away  Redness may also last a week or longer  Results may take up to 3 or 4 months after treatment  More procedures may be needed    Who should I call with questions?  [facility]  [place]: [phone]  [facility]: [phone]  For urgent needs outside of business hours call the [facility] at [phone] and ask for the dermatology resident on call  [employer] messaging response may be delayed, please call for urgent issues     Intralesional Kenalog (ILK) Injections    Intralesional steroid injection involves a corticosteroid, such as triamcinolone acetonide (brand name Kenalog), which is injected directly into a lesion on or immediately below the skin. Intralesional kenalog may be used to treat many skin conditions:    Alopecia areata  Discoid lupus erythematosus  Keloids/hypertrophic scars  Granuloma annulare and other granulomatous disorders  Hypertrophic lichen planus  Lichen simplex chronicus (neurodermatitis)  Localised psoriasis  Necrobiosis lipoidica  Acne cysts (nodulocystic acne)  Small infantile hemangiomas    Possible side-effects of intralesional Kenalog (ILK) injections include bleeding, pain, skin thinning,infection, contact dermatitis, nerve damage, scar formation and need for a repeat procedure.    Some people may experience delayed side-effects including:  Lipoatrophy, appearing as skin indentations or dimples around the injection sites a few weeks after treatment; these may be permanent.  White marks (leukoderma) or brown marks (postinflammatory pigmentation) at the site of injection or spreading from the site of injection - these may resolve or persist long term.  Telangiectasia, or small dilated blood vessels at the site of injection.   Increased hair growth at the site of injection - this resolves eventually.  Steroid acne: steroids increase growth hormone, leading to increased sebum (oil) production by the sebaceous glands. Steroid acne generally improves once the steroid has been stopped.      Who should I call with questions?  [facility]: [phone]   [facility]:  [phone]  For urgent needs outside of business hours call the [facility] at [phone] and ask for the dermatology resident on call         Screen Shot 2022-05-16 at 8.09.26 AM

## 2022-08-10 NOTE — PROGRESS NOTES
[facility] Dermatology Note  Encounter Date: Aug 10, 2022  Office Visit     Dermatology Problem List:  # Keloids, central chest and left neck s/p ILK 8/10/2022, 6/29/2022  - Future considerations: laser treatment, 5-FU injections  # Inflamed acrochordons on the neck s/p cryo 6/29/2022  ____________________________________________    Assessment & Plan:    # Keloids, central chest and left neck.  Patient has had 3 previous steroid injections. Last injection was 6/29/2022 with high-dose steroid. Patient with improved flattening of keloid to central chest but not to the keloid of his left neck. Today we will repeat injection to both sites with high-dose steroid. Will also refer to Dr. Steiner discuss PDL or 5FU/ILK for keloid scars.     Procedures Performed:   - Intra-lesional triamcinolone procedure note. After verbal consent and review of risk of pain and skin thinning/atrophy, positioning and cleansing with isopropyl alcohol, 0.8 total mL of triamcinolone 40 mg/mL was injected into 2 keloids on the chest and neck. The patient tolerated the procedure well and left the dermatology clinic in good condition.    Follow-up: Will refer to Dr. Steiner to discuss PDL or 5FU/ILK for keloid scars.     Staff, Medical Student, and Scribe:     Scribe Disclosure:  I, Micki Anderson, am serving as a scribe to document services personally performed by Peter Adame MD based on data collection and the provider's statements to me.     Ligia Jamison, MS3  [facility] [employer]   August 10, 2022    Provider Disclosure:   The documentation recorded by the scribe accurately reflects the services I personally performed and the decisions made by me.    Staff Physician:  I was present with the medical student who participated in the service and in the documentation of the note. I have verified the history and personally performed the physical exam and medical decision making. I agree with the assessment  and plan of care as documented in the note. The procedure(s) was(were) performed by myself.    Peter Adame MD  Pronouns: he/him/his    Department of Dermatology  [facility]: Phone: [phone], Fax:[phone]  [facility]: Phone: [phone] Fax: [phone]  ____________________________________________    CC: Derm Problem (Here for a follow up for keloid, no concerns)    HPI:  Mr. Adan Adrian is a(n) 37 year old male who presents today as a return patient for keloids. The patient was last seen in dermatology on 6/29/22 by myself at which time ILK injections were administered into 2 keloids on the chest and left neck. Additionally, cryo was used to treat inflamed acrochordons on the neck.     Patient has seen improvement in the keloid on his chest since his last steroid injection 6/29/22. He states the keloid on his chest appears flatter than before but he has not noticed improvement to the keloid on his left neck. Previously he had lower dose steroids without improvement but he did see good improvement with the higher dose steroid from last time. Patient would like to try steroid injection again.     Patient is pleased with the results of the cryotherapy at last visit to the numerous skin tags on his neck. He notes there are still marks but he thinks these will disappear with time.     Patient is otherwise feeling well, without additional skin concerns.    Labs Reviewed:  N/A    Physical Exam:  Vitals: There were no vitals taken for this visit.  SKIN: Focused examination of neck and chest was performed.  - Approximately 18 hyperpigmented, dark brown macules to neck at the sites of previous cryotherapy of arachordons   - Pink linear keloidal plaque on cental chest  - Pink linear keloidal plaque to left neck   - No other lesions of concern on areas examined.     Medications:  No current  outpatient medications on file.     Current Facility-Administered Medications   Medication     triamcinolone (KENALOG-40) injection 40 mg      Past Medical History:   Patient Active Problem List   Diagnosis     CARDIOVASCULAR SCREENING; LDL GOAL LESS THAN 160     Low HDL (under 40)     IFG (impaired fasting glucose)     Back pain     Left ankle pain     History reviewed. No pertinent past medical history.     CC Toño Allred MD  [address] [address] on close of this encounter.

## 2022-08-10 NOTE — NURSING NOTE
Chief Complaint   Patient presents with     Derm Problem     Here for a follow up for keloid, no concerns     Afsaneh RICO, EMT  Dermatology/Dermatology Surgery  [phone]

## 2022-08-10 NOTE — PROGRESS NOTES
Drug Administration Record    Prior to injection, verified patient identity using patient's name and date of birth.  Due to injection administration, patient instructed to remain in clinic for 15 minutes  afterwards, and to report any adverse reaction to me immediately.    Drug Name: triamcinolone acetonide(kenalog)  Dose: 1mL of triamcinolone 40mg/mL, 40mg dose  Route administered: ID  NDC #: Kenalog-40 ([address]-6)  Amount of waste(mL):0mL  Reason for waste: Single use vial    LOT #: [id number]  SITE: see note  : [employer]  EXPIRATION DATE: 01/2024

## 2022-08-10 NOTE — PROGRESS NOTES
[facility] Dermatology Note  Encounter Date: Aug 17, 2022       Dermatology Problem List:    # Keloids, central chest and left neck s/p ILK 8/10/2022, 6/29/2022  - Future considerations: laser treatment, 5-FU injections  # Inflamed acrochordons on the neck s/p cryo 6/29/2022    ____________________________________________    Assessment & Plan:    # Keloids, central chest and left neck.- Has had 4 injections of steroids. Improved minimally    -start scar away  -recommend sunscreen  -Letter for [address] initiated.   -Future-IL-K efudex  Procedures Performed:   na    Follow-up: 6 weeks, have kenalog ten ready  Staff and Scribe:     I, Evelyn Silva, am serving as a scribe to document services personally performed by Gayle Steiner MD based on data collection and the provider's statements to me.     Provider Disclosure:   The documentation recorded by the scribe accurately reflects the services I personally performed and the decisions made by me.    Gayle Steiner MD    Department of Dermatology  [facility]: Phone: [phone], Fax:[phone]  [facility]: Phone: [phone], Fax: [phone]      ____________________________________________    CC: Scar Management (Adan is here to discuss treatment options for a keloid scar on his chest and neck. )    HPI:  Mr. Adan Adrian is a(n) 37 year old male who presents today as a new patient for evaluation of keloids.. Had steroid injection still not much better    Patient is otherwise feeling well, without additional skin concerns.    Labs Reviewed:       Physical Exam:  Vitals: There were no vitals taken for this visit.  SKIN:    - keloidal scar on the chest  -lesion on left neck scar like (pt reports recent IL-K)  - No other lesions of concern on areas examined.     Medications:  No current outpatient medications on file.      Current Facility-Administered Medications   Medication     triamcinolone (KENALOG-40) injection 32 mg     triamcinolone (KENALOG-40) injection 40 mg      Past Medical History:   Patient Active Problem List   Diagnosis     CARDIOVASCULAR SCREENING; LDL GOAL LESS THAN 160     Low HDL (under 40)     IFG (impaired fasting glucose)     Back pain     Left ankle pain     No past medical history on file.     CC No referring provider defined for this encounter. on close of this encounter.        Re:Request for laser coverage    To Whom This May Concern,    We are writing to request coverage of keloids. This patient has symptoms of including itchy and pain scars on th chest AND NECK .     This type of LESION RESPONDS WELL TO PULSED DYE LASER. We would expect 1 treatments every 6 weeks for a maximum of 3-6 treatments.     The CPT Code Description utilized would be 14901: Unlisted procedure, skin, mucous membrane and subcutaneous tissue.  The pricing is 500.       Other treatments that can be considered include SURGERY AND 5-fu. In addition to being more invasive, the cost of this intervention is much larger:      We strive to provide our patient with outstanding care. Therefore, I request you please contact me at [phone] if you have any questions regarding coverage or our treatment rational.     Gayle Steiner MD    Department of Dermatology  [facility]: Phone: [phone], Fax:[phone]  [facility]: Phone: [phone], Fax: [phone]

## 2022-08-10 NOTE — LETTER
8/10/2022       RE: Adan Adrian  [address]  [facility] [address]     Dear Colleague,    Thank you for referring your patient, Adan Adrian, to the [facility] DERMATOLOGY CLINIC [place] at [facility]. Please see a copy of my visit note below.    [facility] Dermatology Note  Encounter Date: Aug 10, 2022  Office Visit     Dermatology Problem List:  # Keloids, central chest and left neck s/p ILK 8/10/2022, 6/29/2022  - Future considerations: laser treatment, 5-FU injections  # Inflamed acrochordons on the neck s/p cryo 6/29/2022  ____________________________________________    Assessment & Plan:    # Keloids, central chest and left neck.  Patient has had 3 previous steroid injections. Last injection was 6/29/2022 with high-dose steroid. Patient with improved flattening of keloid to central chest but not to the keloid of his left neck. Today we will repeat injection to both sites with high-dose steroid. Will also refer to Dr. Steiner discuss PDL or 5FU/ILK for keloid scars.     Procedures Performed:   - Intra-lesional triamcinolone procedure note. After verbal consent and review of risk of pain and skin thinning/atrophy, positioning and cleansing with isopropyl alcohol, 0.8 total mL of triamcinolone 40 mg/mL was injected into 2 keloids on the chest and neck. The patient tolerated the procedure well and left the dermatology clinic in good condition.    Follow-up: Will refer to Dr. Steiner to discuss PDL or 5FU/ILK for keloid scars.     Staff, Medical Student, and Scribe:     Scribe Disclosure:  I, Micki Anderson, am serving as a scribe to document services personally performed by Peter Adame MD based on data collection and the provider's statements to me.     Ligia Jamison, MS3  [employer]   August 10, 2022    Provider Disclosure:   The documentation recorded by the scribe accurately  reflects the services I personally performed and the decisions made by me.    Staff Physician:  I was present with the medical student who participated in the service and in the documentation of the note. I have verified the history and personally performed the physical exam and medical decision making. I agree with the assessment and plan of care as documented in the note. The procedure(s) was(were) performed by myself.    Peter Adame MD  Pronouns: he/him/his    Department of Dermatology  [facility]: Phone: [phone], Fax:[phone]  [facility]: Phone: [phone] Fax: [phone]  ____________________________________________    CC: Derm Problem (Here for a follow up for keloid, no concerns)    HPI:  Mr. Adan Adrian is a(n) 37 year old male who presents today as a return patient for keloids. The patient was last seen in dermatology on 6/29/22 by myself at which time ILK injections were administered into 2 keloids on the chest and left neck. Additionally, cryo was used to treat inflamed acrochordons on the neck.     Patient has seen improvement in the keloid on his chest since his last steroid injection 6/29/22. He states the keloid on his chest appears flatter than before but he has not noticed improvement to the keloid on his left neck. Previously he had lower dose steroids without improvement but he did see good improvement with the higher dose steroid from last time. Patient would like to try steroid injection again.     Patient is pleased with the results of the cryotherapy at last visit to the numerous skin tags on his neck. He notes there are still marks but he thinks these will disappear with time.     Patient is otherwise feeling well, without additional skin concerns.    Labs Reviewed:  N/A    Physical Exam:  Vitals: There were no vitals taken for this visit.  SKIN:  Focused examination of neck and chest was performed.  - Approximately 18 hyperpigmented, dark brown macules to neck at the sites of previous cryotherapy of arachordons   - Pink linear keloidal plaque on cental chest  - Pink linear keloidal plaque to left neck   - No other lesions of concern on areas examined.     Medications:  No current outpatient medications on file.     Current Facility-Administered Medications   Medication     triamcinolone (KENALOG-40) injection 40 mg      Past Medical History:   Patient Active Problem List   Diagnosis     CARDIOVASCULAR SCREENING; LDL GOAL LESS THAN 160     Low HDL (under 40)     IFG (impaired fasting glucose)     Back pain     Left ankle pain     History reviewed. No pertinent past medical history.     CC Toño Allred MD  [address] [address] on close of this encounter.    Drug Administration Record    Prior to injection, verified patient identity using patient's name and date of birth.  Due to injection administration, patient instructed to remain in clinic for 15 minutes  afterwards, and to report any adverse reaction to me immediately.    Drug Name: triamcinolone acetonide(kenalog)  Dose: 1mL of triamcinolone 40mg/mL, 40mg dose  Route administered: ID  NDC #: Kenalog-40 ([address]-4)  Amount of waste(mL):0mL  Reason for waste: Single use vial    LOT #: [id number]  SITE: see note  : [employer]  EXPIRATION DATE: 01/2024

## 2022-08-17 ENCOUNTER — OFFICE VISIT (OUTPATIENT)
Dept: DERMATOLOGY | Facility: CLINIC | Age: 37
End: 2022-08-17
Payer: COMMERCIAL

## 2022-08-17 DIAGNOSIS — L91.0 KELOID: Primary | ICD-10-CM

## 2022-08-17 PROCEDURE — 99213 OFFICE O/P EST LOW 20 MIN: CPT | Performed by: DERMATOLOGY

## 2022-08-17 ASSESSMENT — PAIN SCALES - GENERAL: PAINLEVEL: NO PAIN (0)

## 2022-08-17 NOTE — LETTER
8/17/2022       RE: Adan Adrian  [address]  [facility] [address]     Dear Colleague,    Thank you for referring your patient, Adan Adrian, to the [facility] DERMATOLOGY CLINIC [place] at [facility]. Please see a copy of my visit note below.    [facility] Dermatology Note  Encounter Date: Aug 17, 2022       Dermatology Problem List:    # Keloids, central chest and left neck s/p ILK 8/10/2022, 6/29/2022  - Future considerations: laser treatment, 5-FU injections  # Inflamed acrochordons on the neck s/p cryo 6/29/2022    ____________________________________________    Assessment & Plan:    # Keloids, central chest and left neck.- Has had 4 injections of steroids. Improved minimally    -start scar away  -recommend sunscreen  -Letter for 54439 initiated.   -Future-IL-K efudex  Procedures Performed:   na    Follow-up: 6 weeks, have kenalog ten ready  Staff and Scribe:     I, Evelny Silva, am serving as a scribe to document services personally performed by Gayle Steiner MD based on data collection and the provider's statements to me.     Provider Disclosure:   The documentation recorded by the scribe accurately reflects the services I personally performed and the decisions made by me.    Gayle Steiner MD    Department of Dermatology  [facility]: Phone: [phone], Fax:[phone]  [facility]: Phone: [phone], Fax: [phone]      ____________________________________________    CC: Scar Management (Adan is here to discuss treatment options for a keloid scar on his chest and neck. )    HPI:  Mr. Adan Adrian is a(n) 37 year old male who presents today as a new patient for evaluation of keloids.. Had steroid injection still not much better    Patient is otherwise feeling well, without  additional skin concerns.    Labs Reviewed:       Physical Exam:  Vitals: There were no vitals taken for this visit.  SKIN:    - keloidal scar on the chest  -lesion on left neck scar like (pt reports recent IL-K)  - No other lesions of concern on areas examined.     Medications:  No current outpatient medications on file.     Current Facility-Administered Medications   Medication     triamcinolone (KENALOG-40) injection 32 mg     triamcinolone (KENALOG-40) injection 40 mg      Past Medical History:   Patient Active Problem List   Diagnosis     CARDIOVASCULAR SCREENING; LDL GOAL LESS THAN 160     Low HDL (under 40)     IFG (impaired fasting glucose)     Back pain     Left ankle pain     No past medical history on file.     CC No referring provider defined for this encounter. on close of this encounter.        Re:Request for laser coverage    To Whom This May Concern,    We are writing to request coverage of keloids. This patient has symptoms of including itchy and pain scars on th chest AND NECK .     This type of LESION RESPONDS WELL TO PULSED DYE LASER. We would expect 1 treatments every 6 weeks for a maximum of 3-6 treatments.     The CPT Code Description utilized would be 42365: Unlisted procedure, skin, mucous membrane and subcutaneous tissue.  The pricing is 500.       Other treatments that can be considered include SURGERY AND 5-fu. In addition to being more invasive, the cost of this intervention is much larger:      We strive to provide our patient with outstanding care. Therefore, I request you please contact me at [phone] if you have any questions regarding coverage or our treatment rational.     Gyale Steiner MD    Department of Dermatology  [facility]: Phone: [phone], Fax:[phone]  [facility]: Phone: [phone], Fax: [phone]

## 2022-08-17 NOTE — NURSING NOTE
Dermatology Rooming Note    Adan Adrian's goals for this visit include:   Chief Complaint   Patient presents with     Scar Management     Adan is here to discuss treatment options for a keloid scar on his chest and neck.      Rae Bone, Facilitator

## 2022-09-03 ENCOUNTER — HEALTH MAINTENANCE LETTER (OUTPATIENT)
Age: 37
End: 2022-09-03

## 2022-09-28 NOTE — PATIENT INSTRUCTIONS
Pulsed Dye Laser (PDL)    I will experience redness, swelling, pain, and heat sensation. I may experience bruising, itching, or acne. Risks are blistering, oozing, permanent scarring, hair loss, temporary or permanent skin lightening or darkening, infection, and eye injury. I understand my outcome could be no improvement, slight improvement. Multiple treatments may be required.    After treatment, Do Not:  Rub, scratch, or put weight on the site for 2 weeks  Wear tight fitting clothing or jewelry over the site  Hopkins. Keep the site out of sunlight. Use sunscreen of 30 SPF or greater when in the sun. Use sunscreen 30 minutes before going out and reapply if sweating. Tanning decreases the success of the treatment    How do I care for the treated site?  Use ice packs for 10-20 minutes after the procedure for swelling   If the site is on your face, use ice again 1 hour after treatment for ten minutes and repeat again before bed. Do not burn the skin with the ice.   If a scab or crust forms, gently cleanse the site with water. Then put on Vaseline  ointment 3 times a day and contact the clinic   If a blister forms, contact the clinic by phone  If you have concerns about swelling, call the clinic  Avoid sun exposure and do not get tan as this can darken the treated area, use sunscreen  Do not use makeup on any open wound  Do not come to your next treatment with a tan    What should I expect?  Mild swelling  Blue-purple color that may take 2 to 3 weeks to go away  Redness may also last a week or longer  Results may take up to 3 or 4 months after treatment  More procedures may be needed    Who should I call with questions?  [facility]: [phone]  [facility]: [phone]  For urgent needs outside of business hours call the [facility] at [phone] and ask for the dermatology resident on call  Emotient messaging response may be delayed, please call for  urgent issues

## 2022-09-28 NOTE — PROGRESS NOTES
[facility] Dermatology Note  Encounter Date: Oct 5, 2022  Office Visit     Dermatology Problem List:  1. Keloids, central chest and left neck s/p ILK 8/10/2022, 6/29/2022  - Future considerations: laser treatment, 5-FU injections  2. Inflamed acrochordons on the neck s/p cryo 6/29/2022  ____________________________________________    Assessment & Plan:    # Keloid Scars, central chest today   - ILK injections today, see procedure note below   - PDL today, see procedure note    #Left neck, reports history of lesion injected, no keloid today, describes white possible discharge  - this could be a cyst, offered removal and declines  -recheck at follow  -consider ultrasound if not going away, he declines today    Procedures Performed:   - Intra-lesional triamcinolone procedure note. After verbal consent and review of risk of pain and skin thinning/atrophy, positioning and cleansing with isopropyl alcohol, 0.3 total mL of triamcinolone 20 mg/mL was injected into 4 areas with 1 lesion(s) on the central chest. The patient tolerated the procedure well and left the dermatology clinic in good condition.  - See medical procedure note for PDL    Follow-up: 4-8 weeks    Staff and Scribe:     Scribe Disclosure:  I, MAXIMILIANO BRASHER, am serving as a scribe to document services personally performed by Gayle Steiner MD based on data collection and the provider's statements to me.     Provider Disclosure:   The documentation recorded by the scribe accurately reflects the services I personally performed and the decisions made by me.    Gayle Steiner MD    Department of Dermatology  [facility]: Phone: [phone], Fax:[phone]  [facility]: Phone: [phone], Fax: [phone]      ____________________________________________    CC: Derm Problem    HPI:  Mr. Adan Adrian is a(n) 37 year old  male who presents today as a return patient for keloid follow up.    Today the patient reports the scar is improving after last injections.    Reports possible white discharge from lesion on neck    Patient is otherwise feeling well, without additional skin concerns.    Labs Reviewed:  N/A    Physical Exam:  Vitals: There were no vitals taken for this visit.  SKIN: Focused examination of the central chest was performed.  - Left neck normal  -keloidal scar on the chest, improved  - sub Q oval nodule left neck  - No other lesions of concern on areas examined.     Medications:  No current outpatient medications on file.     Current Facility-Administered Medications   Medication     triamcinolone (KENALOG-40) injection 32 mg     triamcinolone (KENALOG-40) injection 40 mg      Past Medical History:   Patient Active Problem List   Diagnosis     CARDIOVASCULAR SCREENING; LDL GOAL LESS THAN 160     Low HDL (under 40)     IFG (impaired fasting glucose)     Back pain     Left ankle pain     No past medical history on file.     CC Gayle Steiner MD  [address] [facility] [address]  [place] [address] on close of this encounter.

## 2022-10-05 ENCOUNTER — OFFICE VISIT (OUTPATIENT)
Dept: DERMATOLOGY | Facility: CLINIC | Age: 37
End: 2022-10-05
Payer: COMMERCIAL

## 2022-10-05 DIAGNOSIS — L91.0 KELOID SCAR: Primary | ICD-10-CM

## 2022-10-05 PROCEDURE — 99213 OFFICE O/P EST LOW 20 MIN: CPT | Mod: 25 | Performed by: DERMATOLOGY

## 2022-10-05 PROCEDURE — 17999 UNLISTD PX SKN MUC MEMB SUBQ: CPT | Performed by: DERMATOLOGY

## 2022-10-05 PROCEDURE — 11900 INJECT SKIN LESIONS </W 7: CPT | Performed by: DERMATOLOGY

## 2022-10-05 NOTE — NURSING NOTE
Clinic Administered Medication Documentation    Administrations This Visit     triamcinolone acetonide (KENALOG-10) injection 6 mg     Admin Date  10/05/2022 Action  Given Dose  6 mg Route  Intra-Lesional Site   Administered By  Jeanette Jin, RN    Ordering Provider: Gayle Steiner MD    NDC: [address]-0    Lot#: [id number]    : [employer]    Patient Supplied?: No

## 2022-10-05 NOTE — PROCEDURES
Laser- VBeam(Pulsed Dye Laser) Procedure Note: Medical    Dermatology Problem List:  1. Keloids, central chest and left neck s/p ILK 8/10/2022, 2022  - Future considerations: laser treatment, 5-FU injections  2. Inflamed acrochordons on the neck s/p cryo 2022    Procedure Date: Oct 5, 2022    Attending Staff Surgeon: Dr. Steiner    Assistant: Jeanette Jin RN    Operating Room Data:     Surgery/Procedure Date:    SAME     Pre-operative Diagnosis:   Scar  Location: chest  Size: 7 mm  Number of lesions: 1    Operation/Procedure    Vbeam pulsed dye laser treatment#: 1     Post-operative Diagnosis:  SAME    Laser Settings:  Energy:4.5 J/cm2  Spot size:7mm  Pulse width:  1.5 mS (0.45 thru 40 mS)  Dynamic cooling spray settin mS  Dynamic cooling device delay:  20 mS      Fotofinder photos: No    Anesthesia:  None    Description of Operation/Procedure:   The nature and purpose of the procedure, associated risks, possible consequences, complications and alternative methods of treatment were explained in detail, this includes but is not limited to hyperpigmentation, hypopigmentation, scarring, bruising, hair loss pain/discomfort, eye injury, hair loss,  and blister. We reviewed that the outcome could be any of the following: no improvement, slight improvement or change in skin color & texture, the skin might be permanently lighter or darker, and though uncommon, superficial scarring may occur.  Multiple treatments may be recommended.   A photo and operative consent were obtained. Time-out was performed.The patient was positioned to optimally expose the area treated. Protective eyewear was worn by the patient and goggles on all personnel in the treatment room. The patient confirmed the site to be treated. The laser energy output was verified by meter reading.      The clinically evident lesion(s) was/were treated with [place] Vbeam pulsed dye laser (595 nm) beam as above. A total of 7 pulses were used. The  patient tolerated the procedure well and no complications were noted. Post operative instructions were provided. The total laser operation and preparation time was 10 minutes.  The patient was counseled to call immediately for any issues and read the after visit summary for emergency contact information.     The patient will follow-up in 4-8 weeks.    The patient will NOT pay the cosmetic fee today.     Staff Involved:  Scribe/Staff    Scribe Disclosure:  IMAXIMILIANO, am serving as a scribe to document services personally performed by Gayle Steiner MD based on data collection and the provider's statements to me.       Provider Disclosure:   The documentation recorded by the scribe accurately reflects the services I personally performed and the decisions made by me.    Gayle Steiner MD    Department of Dermatology  [facility]: Phone: [phone], Fax:[phone]  [facility]: Phone: [phone], Fax: [phone]

## 2022-10-05 NOTE — LETTER
10/5/2022       RE: Adan Adrian  [address]  [facility] [address]     Dear Colleague,    Thank you for referring your patient, Adan Adrian, to the [facility] DERMATOLOGY CLINIC [place] at [facility]. Please see a copy of my visit note below.      [facility] Dermatology Note  Encounter Date: Oct 5, 2022  Office Visit     Dermatology Problem List:  1. Keloids, central chest and left neck s/p ILK 8/10/2022, 6/29/2022  - Future considerations: laser treatment, 5-FU injections  2. Inflamed acrochordons on the neck s/p cryo 6/29/2022  ____________________________________________    Assessment & Plan:    # Keloid Scars, central chest today   - ILK injections today, see procedure note below   - PDL today, see procedure note    #Left neck, reports history of lesion injected, no keloid today, describes white possible discharge  - this could be a cyst, offered removal and declines  -recheck at follow  -consider ultrasound if not going away, he declines today    Procedures Performed:   - Intra-lesional triamcinolone procedure note. After verbal consent and review of risk of pain and skin thinning/atrophy, positioning and cleansing with isopropyl alcohol, 0.3 total mL of triamcinolone 20 mg/mL was injected into 4 areas with 1 lesion(s) on the central chest. The patient tolerated the procedure well and left the dermatology clinic in good condition.  - See medical procedure note for PDL    Follow-up: 4-8 weeks    Staff and Scribe:     Scribe Disclosure:  I, MAXIMILIANO BRASHER, am serving as a scribe to document services personally performed by Gayle Steiner MD based on data collection and the provider's statements to me.     Provider Disclosure:   The documentation recorded by the scribe accurately reflects the services I personally performed and the decisions made by me.    Gayle Steiner MD    Department of  Dermatology  [facility]: Phone: [phone], Fax:[phone]  [facility]: Phone: [phone], Fax: [phone]      ____________________________________________    CC: Derm Problem    HPI:  Mr. Adan Adrian is a(n) 37 year old male who presents today as a return patient for keloid follow up.    Today the patient reports the scar is improving after last injections.    Reports possible white discharge from lesion on neck    Patient is otherwise feeling well, without additional skin concerns.    Labs Reviewed:  N/A    Physical Exam:  Vitals: There were no vitals taken for this visit.  SKIN: Focused examination of the central chest was performed.  - Left neck normal  -keloidal scar on the chest, improved  - sub Q oval nodule left neck  - No other lesions of concern on areas examined.     Medications:  No current outpatient medications on file.     Current Facility-Administered Medications   Medication     triamcinolone (KENALOG-40) injection 32 mg     triamcinolone (KENALOG-40) injection 40 mg      Past Medical History:   Patient Active Problem List   Diagnosis     CARDIOVASCULAR SCREENING; LDL GOAL LESS THAN 160     Low HDL (under 40)     IFG (impaired fasting glucose)     Back pain     Left ankle pain     No past medical history on file.     CC Gayle Steiner MD  [address] [facility] [address]  [place] [address] on close of this encounter.

## 2023-05-16 ENCOUNTER — OFFICE VISIT (OUTPATIENT)
Dept: FAMILY MEDICINE | Facility: CLINIC | Age: 38
End: 2023-05-16
Payer: COMMERCIAL

## 2023-05-16 VITALS
TEMPERATURE: 97.1 F | BODY MASS INDEX: 34.21 KG/M2 | HEIGHT: 72 IN | SYSTOLIC BLOOD PRESSURE: 120 MMHG | DIASTOLIC BLOOD PRESSURE: 78 MMHG | RESPIRATION RATE: 16 BRPM | WEIGHT: 252.6 LBS | HEART RATE: 58 BPM | OXYGEN SATURATION: 97 %

## 2023-05-16 DIAGNOSIS — Z82.49 FAMILY HISTORY OF ISCHEMIC HEART DISEASE: ICD-10-CM

## 2023-05-16 DIAGNOSIS — R07.89 ATYPICAL CHEST PAIN: ICD-10-CM

## 2023-05-16 DIAGNOSIS — Z00.00 ROUTINE GENERAL MEDICAL EXAMINATION AT A HEALTH CARE FACILITY: Primary | ICD-10-CM

## 2023-05-16 LAB — HBA1C MFR BLD: 5.7 % (ref 0–5.6)

## 2023-05-16 PROCEDURE — 93000 ELECTROCARDIOGRAM COMPLETE: CPT | Performed by: FAMILY MEDICINE

## 2023-05-16 PROCEDURE — 99214 OFFICE O/P EST MOD 30 MIN: CPT | Mod: 25 | Performed by: FAMILY MEDICINE

## 2023-05-16 PROCEDURE — 99395 PREV VISIT EST AGE 18-39: CPT | Performed by: FAMILY MEDICINE

## 2023-05-16 PROCEDURE — 83036 HEMOGLOBIN GLYCOSYLATED A1C: CPT | Performed by: FAMILY MEDICINE

## 2023-05-16 PROCEDURE — 80053 COMPREHEN METABOLIC PANEL: CPT | Performed by: FAMILY MEDICINE

## 2023-05-16 PROCEDURE — 36415 COLL VENOUS BLD VENIPUNCTURE: CPT | Performed by: FAMILY MEDICINE

## 2023-05-16 PROCEDURE — 80061 LIPID PANEL: CPT | Performed by: FAMILY MEDICINE

## 2023-05-16 PROCEDURE — 84484 ASSAY OF TROPONIN QUANT: CPT | Performed by: FAMILY MEDICINE

## 2023-05-16 PROCEDURE — 84443 ASSAY THYROID STIM HORMONE: CPT | Performed by: FAMILY MEDICINE

## 2023-05-16 ASSESSMENT — ENCOUNTER SYMPTOMS
FREQUENCY: 0
DYSURIA: 0
NERVOUS/ANXIOUS: 0
EYE PAIN: 0
ARTHRALGIAS: 0
DIARRHEA: 0
CONSTIPATION: 0
FEVER: 0
SHORTNESS OF BREATH: 0
PALPITATIONS: 0
HEMATURIA: 0
CHILLS: 0
ABDOMINAL PAIN: 0
HEMATOCHEZIA: 0
PARESTHESIAS: 0
HEADACHES: 0
WEAKNESS: 0
JOINT SWELLING: 0
MYALGIAS: 0
HEARTBURN: 0
DIZZINESS: 0
NAUSEA: 0
SORE THROAT: 0
COUGH: 0

## 2023-05-16 ASSESSMENT — PAIN SCALES - GENERAL: PAINLEVEL: NO PAIN (0)

## 2023-05-16 NOTE — PROGRESS NOTES
SUBJECTIVE:   CC: Adan is an 37 year old who presents for preventative health visit.       5/16/2023     9:54 AM   Additional Questions   Roomed by Mary Valdovinos     Healthy Habits:     Getting at least 3 servings of Calcium per day:  Yes    Bi-annual eye exam:  NO    Dental care twice a year:  Yes    Sleep apnea or symptoms of sleep apnea:  None    Diet:  Regular (no restrictions)    Frequency of exercise:  2-3 days/week    Duration of exercise:  15-30 minutes    Taking medications regularly:  Yes    Medication side effects:  None    PHQ-2 Total Score: 0    Additional concerns today:  Yes      Patient has family history of coronary artery disease.  He has had chest tightness off-and-on.  No association with exertion or rest.  Denies any association with eating.  Denies heartburn.  No nausea.  Denies any shortness of breath.  He is able to exert without any problems.  He would like cardiac assessment done.  Denies any active chest pain at this time.        Today's PHQ-2 Score:       5/16/2023     9:51 AM   PHQ-2 ( 1999 [employer])   Q1: Little interest or pleasure in doing things 0   Q2: Feeling down, depressed or hopeless 0   PHQ-2 Score 0   Q1: Little interest or pleasure in doing things Not at all    Not at all   Q2: Feeling down, depressed or hopeless Not at all    Not at all   PHQ-2 Score 0    0           Social History     Tobacco Use     Smoking status: Never     Smokeless tobacco: Never   Substance Use Topics     Alcohol use: Yes     Comment: maybe every 2 weeks or a party              5/16/2023     9:51 AM   Alcohol Use   Prescreen: >3 drinks/day or >7 drinks/week? No       Last PSA: No results found for: PSA    Reviewed orders with patient. Reviewed health maintenance and updated orders accordingly - Yes  Patient Active Problem List   Diagnosis     CARDIOVASCULAR SCREENING; LDL GOAL LESS THAN 160     Low HDL (under 40)     IFG (impaired fasting glucose)     Back pain     Left ankle pain     No past surgical  "history on file.    Social History     Tobacco Use     Smoking status: Never     Smokeless tobacco: Never   Substance Use Topics     Alcohol use: Yes     Comment: maybe every 2 weeks or a party      Family History   Problem Relation Age of Onset     Diabetes Mother         Type 2     C.A.D. Father         MI age 55 ()     Myocardial Infarction Father 55     Cancer - colorectal No family hx of      Prostate Cancer No family hx of          No current outpatient medications on file.     Allergies   Allergen Reactions     No Known Drug Allergy        Reviewed and updated as needed this visit by clinical staff   Tobacco  Allergies  Meds     Fam Hx          Reviewed and updated as needed this visit by Provider   Tobacco  Allergies      Fam Hx             Review of Systems   Constitutional: Negative for chills and fever.   HENT: Negative for congestion, ear pain, hearing loss and sore throat.    Eyes: Negative for pain and visual disturbance.   Respiratory: Negative for cough and shortness of breath.    Cardiovascular: Negative for chest pain, palpitations and peripheral edema.   Gastrointestinal: Negative for abdominal pain, constipation, diarrhea, heartburn, hematochezia and nausea.   Genitourinary: Negative for dysuria, frequency, genital sores, hematuria, impotence, penile discharge and urgency.   Musculoskeletal: Negative for arthralgias, joint swelling and myalgias.   Skin: Negative for rash.   Neurological: Negative for dizziness, weakness, headaches and paresthesias.   Psychiatric/Behavioral: Negative for mood changes. The patient is not nervous/anxious.          OBJECTIVE:   /78   Pulse 58   Temp 97.1  F (36.2  C) (Tympanic)   Resp 16   Ht 1.822 m (5' 11.73\")   Wt 114.6 kg (252 lb 9.6 oz)   SpO2 97%   BMI 34.52 kg/m      Physical Exam  GENERAL: healthy, alert and no distress  EYES: Eyes grossly normal to inspection, PERRL and conjunctivae and sclerae normal  HENT: ear canals and TM's " "normal, nose and mouth without ulcers or lesions  NECK: no adenopathy, no asymmetry, masses, or scars and thyroid normal to palpation  RESP: lungs clear to auscultation - no rales, rhonchi or wheezes  CV: regular rate and rhythm, normal S1 S2, no S3 or S4, no murmur, click or rub, no peripheral edema and peripheral pulses strong  ABDOMEN: soft, nontender, no hepatosplenomegaly, no masses and bowel sounds normal  MS: no gross musculoskeletal defects noted, no edema  SKIN: no suspicious lesions or rashes  NEURO: Normal strength and tone, mentation intact and speech normal  PSYCH: mentation appears normal, affect normal/bright        ASSESSMENT/PLAN:       1. Routine general medical examination at a health care facility  Screening fasting labs obtained  - Lipid panel reflex to direct LDL Fasting; Future  - Comprehensive metabolic panel; Future  - Hemoglobin A1c; Future  - TSH with free T4 reflex; Future  - Lipid panel reflex to direct LDL Fasting  - Comprehensive metabolic panel  - Hemoglobin A1c  - TSH with free T4 reflex    2. Atypical chest pain  EKG shows sinus bradycardia without any acute ST or T wave changes.  Cardiac troponins are negative.    If chest pain recur, instructed to go to the ER.  - Troponin T, High Sensitivity; Future  - EKG 12-lead complete w/read - Clinics  - Troponin T, High Sensitivity    3. Family history of ischemic heart disease  Recommending to proceed with a CT coronary calcium scan.  - CT Coronary Calcium Scan; Future        COUNSELING:   Reviewed preventive health counseling, as reflected in patient instructions       Regular exercise       Healthy diet/nutrition      BMI:   Estimated body mass index is 34.52 kg/m  as calculated from the following:    Height as of this encounter: 1.822 m (5' 11.73\").    Weight as of this encounter: 114.6 kg (252 lb 9.6 oz).   Weight management plan: Discussed healthy diet and exercise guidelines      He reports that he has never smoked. He has never used " smokeless tobacco.        Lashonda Hood MD  [facility]

## 2023-05-17 LAB
ALBUMIN SERPL BCG-MCNC: 4.5 G/DL (ref 3.5–5.2)
ALP SERPL-CCNC: 107 U/L (ref 40–129)
ALT SERPL W P-5'-P-CCNC: 39 U/L (ref 10–50)
ANION GAP SERPL CALCULATED.3IONS-SCNC: 13 MMOL/L (ref 7–15)
AST SERPL W P-5'-P-CCNC: 22 U/L (ref 10–50)
BILIRUB SERPL-MCNC: 0.3 MG/DL
BUN SERPL-MCNC: 7 MG/DL (ref 6–20)
CALCIUM SERPL-MCNC: 9.1 MG/DL (ref 8.6–10)
CHLORIDE SERPL-SCNC: 104 MMOL/L (ref 98–107)
CHOLEST SERPL-MCNC: 189 MG/DL
CREAT SERPL-MCNC: 0.75 MG/DL (ref 0.67–1.17)
DEPRECATED HCO3 PLAS-SCNC: 22 MMOL/L (ref 22–29)
GFR SERPL CREATININE-BSD FRML MDRD: >90 ML/MIN/1.73M2
GLUCOSE SERPL-MCNC: 100 MG/DL (ref 70–99)
HDLC SERPL-MCNC: 29 MG/DL
LDLC SERPL CALC-MCNC: 124 MG/DL
NONHDLC SERPL-MCNC: 160 MG/DL
POTASSIUM SERPL-SCNC: 4.1 MMOL/L (ref 3.4–5.3)
PROT SERPL-MCNC: 7.5 G/DL (ref 6.4–8.3)
SODIUM SERPL-SCNC: 139 MMOL/L (ref 136–145)
TRIGL SERPL-MCNC: 180 MG/DL
TROPONIN T SERPL HS-MCNC: 7 NG/L
TSH SERPL DL<=0.005 MIU/L-ACNC: 1.07 UIU/ML (ref 0.3–4.2)

## 2023-06-06 ENCOUNTER — HOSPITAL ENCOUNTER (OUTPATIENT)
Dept: CARDIOLOGY | Facility: CLINIC | Age: 38
Discharge: HOME | End: 2023-06-06
Attending: FAMILY MEDICINE | Admitting: FAMILY MEDICINE
Payer: COMMERCIAL

## 2023-06-06 DIAGNOSIS — Z82.49 FAMILY HISTORY OF ISCHEMIC HEART DISEASE: ICD-10-CM

## 2023-06-06 PROCEDURE — 75571 CT HRT W/O DYE W/CA TEST: CPT | Mod: GA

## 2023-06-06 PROCEDURE — 75571 CT HRT W/O DYE W/CA TEST: CPT | Mod: 26 | Performed by: INTERNAL MEDICINE

## 2024-01-05 ENCOUNTER — TRANSFERRED RECORDS (OUTPATIENT)
Dept: HEALTH INFORMATION MANAGEMENT | Facility: CLINIC | Age: 39
End: 2024-01-05
Payer: COMMERCIAL

## 2024-07-06 ENCOUNTER — HEALTH MAINTENANCE LETTER (OUTPATIENT)
Age: 39
End: 2024-07-06

## 2024-09-19 ENCOUNTER — OFFICE VISIT (OUTPATIENT)
Dept: FAMILY MEDICINE | Facility: CLINIC | Age: 39
End: 2024-09-19
Payer: COMMERCIAL

## 2024-09-19 VITALS
HEIGHT: 72 IN | BODY MASS INDEX: 35.16 KG/M2 | TEMPERATURE: 97.4 F | WEIGHT: 259.6 LBS | SYSTOLIC BLOOD PRESSURE: 132 MMHG | RESPIRATION RATE: 21 BRPM | HEART RATE: 60 BPM | OXYGEN SATURATION: 98 % | DIASTOLIC BLOOD PRESSURE: 72 MMHG

## 2024-09-19 DIAGNOSIS — Z00.00 ANNUAL PHYSICAL EXAM: Primary | ICD-10-CM

## 2024-09-19 LAB
CHOLEST SERPL-MCNC: 197 MG/DL
EST. AVERAGE GLUCOSE BLD GHB EST-MCNC: 114 MG/DL
FASTING STATUS PATIENT QL REPORTED: YES
FASTING STATUS PATIENT QL REPORTED: YES
GLUCOSE SERPL-MCNC: 102 MG/DL (ref 70–99)
HBA1C MFR BLD: 5.6 % (ref 0–5.6)
HDLC SERPL-MCNC: 32 MG/DL
LDLC SERPL CALC-MCNC: 136 MG/DL
NONHDLC SERPL-MCNC: 165 MG/DL
TRIGL SERPL-MCNC: 147 MG/DL
TSH SERPL DL<=0.005 MIU/L-ACNC: 0.87 UIU/ML (ref 0.3–4.2)

## 2024-09-19 PROCEDURE — 82947 ASSAY GLUCOSE BLOOD QUANT: CPT | Performed by: FAMILY MEDICINE

## 2024-09-19 PROCEDURE — 83036 HEMOGLOBIN GLYCOSYLATED A1C: CPT | Performed by: FAMILY MEDICINE

## 2024-09-19 PROCEDURE — 36415 COLL VENOUS BLD VENIPUNCTURE: CPT | Performed by: FAMILY MEDICINE

## 2024-09-19 PROCEDURE — 80061 LIPID PANEL: CPT | Performed by: FAMILY MEDICINE

## 2024-09-19 PROCEDURE — 84443 ASSAY THYROID STIM HORMONE: CPT | Performed by: FAMILY MEDICINE

## 2024-09-19 PROCEDURE — 99396 PREV VISIT EST AGE 40-64: CPT | Performed by: FAMILY MEDICINE

## 2024-09-19 ASSESSMENT — PAIN SCALES - GENERAL: PAINLEVEL: NO PAIN (0)

## 2024-09-19 NOTE — PROGRESS NOTES
"Preventive Care Visit  [facility] RANI Hood MD, Family Medicine  Sep 19, 2024      Assessment & Plan     Annual physical exam    - Lipid panel reflex to direct LDL Fasting; Future  - Glucose; Future  - Hemoglobin A1c; Future  - TSH with free T4 reflex; Future  - Lipid panel reflex to direct LDL Fasting  - Glucose  - Hemoglobin A1c  - TSH with free T4 reflex            BMI  Estimated body mass index is 35.51 kg/m  as calculated from the following:    Height as of this encounter: 1.821 m (5' 11.69\").    Weight as of this encounter: 117.8 kg (259 lb 9.6 oz).   Weight management plan: Discussed healthy diet and exercise guidelines          Nancy Arellano is a 39 year old, presenting for the following:  Physical        9/19/2024     9:55 AM   Additional Questions   Roomed by Jenn SAUCEDO        Health Care Directive  Patient does not have a Health Care Directive or Living Will:     Healthy Habits:     Getting at least 3 servings of Calcium per day:  Yes    Bi-annual eye exam:  NO    Dental care twice a year:  Yes    Sleep apnea or symptoms of sleep apnea:  None    Diet:  Regular (no restrictions)    Frequency of exercise:  2-3 days/week    Taking medications regularly:  Not Applicable    Barriers to taking medications:  Not applicable    Medication side effects:  Not applicable                9/19/2024   General Health   How would you rate your overall physical health? Good   Feel stress (tense, anxious, or unable to sleep) Not at all            9/19/2024   Nutrition   Three or more servings of calcium each day? Yes   Diet: Regular (no restrictions)   How many servings of fruit and vegetables per day? (!) 2-3   How many sweetened beverages each day? 0-1            9/19/2024   Exercise   Days per week of moderate/strenous exercise 3 days            9/19/2024   Social Factors   Frequency of gathering with friends or relatives Once a week            9/19/2024   Dental   Dentist two times every " year? Yes               Today's PHQ-2 Score:       2024    10:01 AM   PHQ-2 (  [employer])   Q1: Little interest or pleasure in doing things 0   Q2: Feeling down, depressed or hopeless 0   PHQ-2 Score 0   Q1: Little interest or pleasure in doing things Not at all   Q2: Feeling down, depressed or hopeless Not at all   PHQ-2 Score 0           2023   Substance Use   Alcohol more than 3/day or more than 7/wk No        Social History     Tobacco Use    Smoking status: Never    Smokeless tobacco: Never   Substance Use Topics    Alcohol use: Yes     Comment: maybe every 2 weeks or a party     Drug use: No       ASCVD Risk   The 10-year ASCVD risk score (Yola CORDOBA, et al., 2019) is: 2.6%    Values used to calculate the score:      Age: 39 years      Sex: Male      Is Non- : No      Diabetic: No      Tobacco smoker: No      Systolic Blood Pressure: 132 mmHg      Is BP treated: No      HDL Cholesterol: 29 mg/dL      Total Cholesterol: 189 mg/dL         No data to display                 Reviewed and updated as needed this visit by Provider    Allergies                 Patient Active Problem List   Diagnosis   (none) - all problems resolved or deleted     No past surgical history on file.    Social History     Tobacco Use    Smoking status: Never    Smokeless tobacco: Never   Substance Use Topics    Alcohol use: Yes     Comment: maybe every 2 weeks or a party      Family History   Problem Relation Age of Onset    Diabetes Mother         Type 2    C.A.D. Father         MI age 55 ()    Myocardial Infarction Father 55    Cancer - colorectal No family hx of     Prostate Cancer No family hx of          No current outpatient medications on file.     Allergies   Allergen Reactions    No Known Drug Allergy          Review of Systems  CONSTITUTIONAL: NEGATIVE for fever, chills, change in weight  INTEGUMENTARY/SKIN: NEGATIVE for worrisome rashes, moles or lesions  EYES: NEGATIVE for vision  "changes or irritation  ENT/MOUTH: NEGATIVE for ear, mouth and throat problems  RESP: NEGATIVE for significant cough or SOB  BREAST: NEGATIVE for masses, tenderness or discharge  CV: NEGATIVE for chest pain, palpitations or peripheral edema  GI: NEGATIVE for nausea, abdominal pain, heartburn, or change in bowel habits  : NEGATIVE for frequency, dysuria, or hematuria  MUSCULOSKELETAL: NEGATIVE for significant arthralgias or myalgia  NEURO: NEGATIVE for weakness, dizziness or paresthesias  ENDOCRINE: NEGATIVE for temperature intolerance, skin/hair changes  HEME: NEGATIVE for bleeding problems  PSYCHIATRIC: NEGATIVE for changes in mood or affect     Objective    Exam  /72   Pulse 60   Temp 97.4  F (36.3  C)   Resp 21   Ht 1.821 m (5' 11.69\")   Wt 117.8 kg (259 lb 9.6 oz)   SpO2 98%   BMI 35.51 kg/m     Estimated body mass index is 35.51 kg/m  as calculated from the following:    Height as of this encounter: 1.821 m (5' 11.69\").    Weight as of this encounter: 117.8 kg (259 lb 9.6 oz).    Physical Exam  GENERAL: alert and no distress  EYES: Eyes grossly normal to inspection, PERRL and conjunctivae and sclerae normal  HENT: ear canals and TM's normal, nose and mouth without ulcers or lesions  NECK: no adenopathy, no asymmetry, masses, or scars  RESP: lungs clear to auscultation - no rales, rhonchi or wheezes  CV: regular rate and rhythm, normal S1 S2, no S3 or S4, no murmur, click or rub, no peripheral edema  ABDOMEN: soft, nontender, no hepatosplenomegaly, no masses and bowel sounds normal  MS: no gross musculoskeletal defects noted, no edema  SKIN: no suspicious lesions or rashes  NEURO: Normal strength and tone, mentation intact and speech normal  PSYCH: mentation appears normal, affect normal/bright        Signed Electronically by: Lashonda Hood MD    "

## 2025-02-27 ENCOUNTER — OFFICE VISIT (OUTPATIENT)
Dept: FAMILY MEDICINE | Facility: CLINIC | Age: 40
End: 2025-02-27
Payer: COMMERCIAL

## 2025-02-27 VITALS
RESPIRATION RATE: 16 BRPM | HEIGHT: 72 IN | DIASTOLIC BLOOD PRESSURE: 80 MMHG | SYSTOLIC BLOOD PRESSURE: 135 MMHG | TEMPERATURE: 97.3 F | BODY MASS INDEX: 35.35 KG/M2 | OXYGEN SATURATION: 98 % | WEIGHT: 261 LBS | HEART RATE: 50 BPM

## 2025-02-27 DIAGNOSIS — M21.42 FLAT FEET, BILATERAL: Primary | ICD-10-CM

## 2025-02-27 DIAGNOSIS — H10.022 PINK EYE DISEASE OF LEFT EYE: ICD-10-CM

## 2025-02-27 DIAGNOSIS — R10.9 LEFT SIDED ABDOMINAL PAIN: ICD-10-CM

## 2025-02-27 DIAGNOSIS — M21.41 FLAT FEET, BILATERAL: Primary | ICD-10-CM

## 2025-02-27 DIAGNOSIS — H10.32 ACUTE BACTERIAL CONJUNCTIVITIS OF LEFT EYE: ICD-10-CM

## 2025-02-27 PROCEDURE — 3075F SYST BP GE 130 - 139MM HG: CPT | Performed by: INTERNAL MEDICINE

## 2025-02-27 PROCEDURE — 3079F DIAST BP 80-89 MM HG: CPT | Performed by: INTERNAL MEDICINE

## 2025-02-27 PROCEDURE — 1126F AMNT PAIN NOTED NONE PRSNT: CPT | Performed by: INTERNAL MEDICINE

## 2025-02-27 PROCEDURE — G2211 COMPLEX E/M VISIT ADD ON: HCPCS | Performed by: INTERNAL MEDICINE

## 2025-02-27 PROCEDURE — 99203 OFFICE O/P NEW LOW 30 MIN: CPT | Performed by: INTERNAL MEDICINE

## 2025-02-27 RX ORDER — ERYTHROMYCIN 5 MG/G
0.5 OINTMENT OPHTHALMIC AT BEDTIME
Qty: 3.5 G | Refills: 2 | Status: SHIPPED | OUTPATIENT
Start: 2025-02-27

## 2025-02-27 ASSESSMENT — PAIN SCALES - GENERAL: PAINLEVEL_OUTOF10: NO PAIN (0)

## 2025-02-27 NOTE — PROGRESS NOTES
"  {PROVIDER CHARTING PREFERENCE:306973}    Nancy Arellano is a 39 year old, presenting for the following health issues:  Abdominal Pain  {(!) Visit Details have not yet been documented.  Please enter Visit Details and then use this list to pull in documentation. (Optional):951241}  History of Present Illness       Reason for visit:  Something bother in my upper left of my stomach  Symptom onset:  3-7 days ago  Symptom intensity:  Mild  Symptom progression:  Staying the same  Had these symptoms before:  No   He is taking medications regularly.        {MA/LPN/RN Pre-Provider Visit Orders- hCG/UA/Strep (Optional):017901}  {SUPERLIST (Optional):193725}  {additonal problems for provider to add (Optional):905144}    {ROS Picklists (Optional):629287}      Objective    /80 (BP Location: Right arm, Patient Position: Sitting, Cuff Size: Adult Large)   Pulse 50   Temp 97.3  F (36.3  C) (Temporal)   Resp 16   Ht 1.821 m (5' 11.69\")   Wt 118.4 kg (261 lb)   SpO2 98%   BMI 35.70 kg/m    Body mass index is 35.7 kg/m .  Physical Exam   {Exam List (Optional):781095}    {Diagnostic Test Results (Optional):878593}        Signed Electronically by: Sherrill Gleason MD  {Email feedback regarding this note to [email or web address]   :837789}  " Birth control/protection: I.U.D.   Other Topics Concern    Parent/sibling w/ CABG, MI or angioplasty before 65F 55M? No   Social History Narrative    Not on file     Social Drivers of Health     Financial Resource Strain: Low Risk  (9/19/2024)    Financial Resource Strain     Within the past 12 months, have you or your family members you live with been unable to get utilities (heat, electricity) when it was really needed?: No   Food Insecurity: Low Risk  (9/19/2024)    Food Insecurity     Within the past 12 months, did you worry that your food would run out before you got money to buy more?: No     Within the past 12 months, did the food you bought just not last and you didn t have money to get more?: No   Transportation Needs: Low Risk  (9/19/2024)    Transportation Needs     Within the past 12 months, has lack of transportation kept you from medical appointments, getting your medicines, non-medical meetings or appointments, work, or from getting things that you need?: No   Physical Activity: Unknown (9/19/2024)    Exercise Vital Sign     Days of Exercise per Week: 3 days     Minutes of Exercise per Session: Not on file   Stress: No Stress Concern Present (9/19/2024)    [employer] [place] [employer] - Occupational Stress Questionnaire     Feeling of Stress : Not at all   Social Connections: Unknown (9/19/2024)    Social Connection and Isolation Panel [NHANES]     Frequency of Communication with Friends and Family: Not on file     Frequency of Social Gatherings with Friends and Family: Once a week     Attends Shinto Services: Not on file     Active Member of Clubs or Organizations: Not on file     Attends Club or Organization Meetings: Not on file     Marital Status: Not on file   Interpersonal Safety: Low Risk  (9/19/2024)    Interpersonal Safety     Do you feel physically and emotionally safe where you currently live?: Yes     Within the past 12 months, have you been hit, slapped, kicked or otherwise  "physically hurt by someone?: No     Within the past 12 months, have you been humiliated or emotionally abused in other ways by your partner or ex-partner?: No   Housing Stability: Low Risk  (9/19/2024)    Housing Stability     Do you have housing? : Yes     Are you worried about losing your housing?: No     Past Medical History:   Diagnosis Date    Flat feet, bilateral          Review of Systems  Constitutional, HEENT, cardiovascular, pulmonary, GI, , musculoskeletal, neuro, skin, endocrine and psych systems are negative, except as otherwise noted.      Objective    /80 (BP Location: Right arm, Patient Position: Sitting, Cuff Size: Adult Large)   Pulse 50   Temp 97.3  F (36.3  C) (Temporal)   Resp 16   Ht 1.821 m (5' 11.69\")   Wt 118.4 kg (261 lb)   SpO2 98%   BMI 35.70 kg/m    Body mass index is 35.7 kg/m .  Physical Exam   GENERAL: alert and no distress  EYES: Pink eye disease of left eye symptoms noted  HENT: ear canals and TM's normal, nose and mouth without ulcers or lesions  NECK: no asymmetry  RESP: lungs clear to auscultation - no rales, rhonchi or wheezes  CV: regular rate and rhythm, normal S1 S2  ABDOMEN: soft, left sided abdominal pains noted, bowel sounds normal  MS: bilateral flat feet symptoms noted  SKIN: no suspicious lesions or rashes  NEURO: Normal strength and tone, mentation intact and speech normal  PSYCH: mentation appears normal, affect normal/bright    Labs reviewed in Epic    The longitudinal plan of care for the diagnosis(es)/condition(s) as documented were addressed during this visit. Due to the added complexity in care, I will continue to support Adan in the subsequent management and with ongoing continuity of care.          Signed Electronically by: Sherrill Gleason MD    "

## 2025-02-28 ENCOUNTER — ANCILLARY PROCEDURE (OUTPATIENT)
Dept: CT IMAGING | Facility: CLINIC | Age: 40
End: 2025-02-28
Attending: PHYSICIAN ASSISTANT
Payer: COMMERCIAL

## 2025-02-28 DIAGNOSIS — R10.12 LUQ ABDOMINAL PAIN: ICD-10-CM

## 2025-02-28 PROCEDURE — 250N000009 HC RX 250: Performed by: PHYSICIAN ASSISTANT

## 2025-02-28 PROCEDURE — 74177 CT ABD & PELVIS W/CONTRAST: CPT

## 2025-02-28 PROCEDURE — 250N000011 HC RX IP 250 OP 636: Performed by: PHYSICIAN ASSISTANT

## 2025-02-28 RX ORDER — IOPAMIDOL 755 MG/ML
126 INJECTION, SOLUTION INTRAVASCULAR ONCE
Status: COMPLETED | OUTPATIENT
Start: 2025-02-28 | End: 2025-02-28

## 2025-02-28 RX ADMIN — IOPAMIDOL 126 ML: 755 INJECTION, SOLUTION INTRAVENOUS at 10:05

## 2025-02-28 RX ADMIN — SODIUM CHLORIDE 40 ML: 9 INJECTION, SOLUTION INTRAVENOUS at 10:05

## 2025-03-03 ENCOUNTER — PATIENT OUTREACH (OUTPATIENT)
Dept: CARE COORDINATION | Facility: CLINIC | Age: 40
End: 2025-03-03
Payer: COMMERCIAL

## 2025-03-11 ENCOUNTER — OFFICE VISIT (OUTPATIENT)
Dept: PODIATRY | Facility: CLINIC | Age: 40
End: 2025-03-11
Attending: INTERNAL MEDICINE
Payer: COMMERCIAL

## 2025-03-11 VITALS — WEIGHT: 258 LBS | HEIGHT: 72 IN | BODY MASS INDEX: 34.95 KG/M2

## 2025-03-11 DIAGNOSIS — R29.898 WEAKNESS OF LEFT FOOT: ICD-10-CM

## 2025-03-11 DIAGNOSIS — M21.41 FLAT FEET, BILATERAL: Primary | ICD-10-CM

## 2025-03-11 DIAGNOSIS — M21.42 FLAT FEET, BILATERAL: Primary | ICD-10-CM

## 2025-03-11 PROCEDURE — 99243 OFF/OP CNSLTJ NEW/EST LOW 30: CPT | Performed by: PODIATRIST

## 2025-03-11 NOTE — PATIENT INSTRUCTIONS
Thank you for choosing [facility] Podiatry / Foot & Ankle Surgery!    DR. MAXWELL'S CLINIC LOCATIONS:     [facility] TRIAGE LINE: [phone]   [address] [place] [address] APPOINTMENTS: [phone]   [place] [address] RADIOLOGY: [phone]   (Every other Tues - Wed - Fri PM) SET UP SURGERY: [phone]    PHYSICAL THERAPY: [phone]   [place] SPECIALTY BILLING QUESTIONS: [phone]   [address] FAX: [phone]   [place] [address]    (Thurs & Fri AM)         [place] ORTHOTICS LOCATIONS  [facility]- [address]  [place] [address]  Phone: [phone]  Fax: [phone] [facility]   [address]  [place] [address]  Phone: [phone]  Fax: [phone]   [facility] [place]  [address]  [place] [address]  Phone: [phone]  Fax: [phone] [facility]  [address]  [place] [address]  Phone: [phone]   Fax: [phone]   * Please call any location listed to make an appointment for a casting/fitting. Your referral was sent to their central office and they will all have the order on file.

## 2025-03-11 NOTE — LETTER
"3/11/2025      Adan Adrian  [address]  [facility] [address]      Dear Colleague,    Thank you for referring your patient, Adan Adrian, to the [facility]. Please see a copy of my visit note below.    ASSESSMENT:  Encounter Diagnoses   Name Primary?     Flat feet, bilateral Yes     Weakness of left foot      MEDICAL DECISION MAKING:  No acute findings on clinical exam.  I did not detect any muscle deficits on the left, compared to the right lower extremity.    I explained that there might be a more proximal nerve problem contributing to his reported lack of movement after long hours of standing.  This could be back related.  Referral to neurology    Adan Adrian is referred to [facility] for prescription orthoses.      Disclaimer: This note consists of symbols derived from keyboarding, dictation and/or voice recognition software. As a result, there may be errors in the script that have gone undetected. Please consider this when interpreting information found in this chart.    Hector Mejia, JOSÉ MIGUEL, FACFAS, MS    [facility] of Pod[facility]/Foot & Ankle [facility]      ____________________________________________________________________    HPI:       I was asked by Sherrill Gleason MD  to evaluate Adan Adrian in consultation for bilateral flat foot.       Adan Adrian presents today reporting a need for new \"inserts for shoes.  \"  He works on his feet in the kitchen.  Reported history of a left foot injury.  He states that x-ray and MRI did not find anything.  He works long hours on his feet.  At the end of the day, \"I can hardly move my left foot.  \"    *  Past Medical History:   Diagnosis Date     Flat feet, bilateral    *  *No past surgical history on file.*  *  Current Outpatient Medications   Medication Sig Dispense Refill     erythromycin (ROMYCIN) 5 MG/GM ophthalmic ointment Place 0.5 inches Into the left eye at bedtime. 3.5 g 2 " "        EXAM:    Vitals: Ht 1.821 m (5' 11.69\")   Wt 117 kg (258 lb)   BMI 35.29 kg/m    BMI: Body mass index is 35.29 kg/m .  Vasc:      Pedal pulses are palpable for the dorsalis pedis posterior tibial artery, bilateral foot.  Capillary fill time </= 3 seconds  Pedal skin appears well-perfused  Neuro:      Light touch sensation intact to all sensory nerve distributions, bilateral foot.  No apparent spastic contractures or other deformity secondary to neurologic compromise.  Derm:      No calluses  No wounds   No worrisome lesions  MSK:      Significant pes planus including midfoot collapse with some rear foot eversion.  Bilateral lower extremity muscle strength presents is normal.  I do not appreciate any deficits on the left, compared to the right.  Adequate ankle and subtalar joint range of motion  Calf:    Neg for redness, swelling or tenderness        Again, thank you for allowing me to participate in the care of your patient.        Sincerely,        Hector Mejia DPM    Electronically signed"

## 2025-03-11 NOTE — PROGRESS NOTES
"ASSESSMENT:  Encounter Diagnoses   Name Primary?    Flat feet, bilateral Yes    Weakness of left foot      MEDICAL DECISION MAKING:  No acute findings on clinical exam.  I did not detect any muscle deficits on the left, compared to the right lower extremity.    I explained that there might be a more proximal nerve problem contributing to his reported lack of movement after long hours of standing.  This could be back related.  Referral to neurology    Adan Adrian is referred to [facility] for prescription orthoses.      Disclaimer: This note consists of symbols derived from keyboarding, dictation and/or voice recognition software. As a result, there may be errors in the script that have gone undetected. Please consider this when interpreting information found in this chart.    Hector Mejia DPM, FACFAS, MS    [facility] of Podiatry/Foot & Ankle Surgery      ____________________________________________________________________    HPI:       I was asked by Sherrill Gleason MD  to evaluate Adan Adrian in consultation for bilateral flat foot.       Adan Adrian presents today reporting a need for new \"inserts for shoes.  \"  He works on his feet in the kitchen.  Reported history of a left foot injury.  He states that x-ray and MRI did not find anything.  He works long hours on his feet.  At the end of the day, \"I can hardly move my left foot.  \"    *  Past Medical History:   Diagnosis Date    Flat feet, bilateral    *  *No past surgical history on file.*  *  Current Outpatient Medications   Medication Sig Dispense Refill    erythromycin (ROMYCIN) 5 MG/GM ophthalmic ointment Place 0.5 inches Into the left eye at bedtime. 3.5 g 2         EXAM:    Vitals: Ht 1.821 m (5' 11.69\")   Wt 117 kg (258 lb)   BMI 35.29 kg/m    BMI: Body mass index is 35.29 kg/m .  Vasc:      Pedal pulses are palpable for the dorsalis pedis posterior tibial artery, bilateral foot.  Capillary fill time </= 3 " seconds  Pedal skin appears well-perfused  Neuro:      Light touch sensation intact to all sensory nerve distributions, bilateral foot.  No apparent spastic contractures or other deformity secondary to neurologic compromise.  Derm:      No calluses  No wounds   No worrisome lesions  MSK:      Significant pes planus including midfoot collapse with some rear foot eversion.  Bilateral lower extremity muscle strength presents is normal.  I do not appreciate any deficits on the left, compared to the right.  Adequate ankle and subtalar joint range of motion  Calf:    Neg for redness, swelling or tenderness

## 2025-08-20 ENCOUNTER — PATIENT OUTREACH (OUTPATIENT)
Dept: CARE COORDINATION | Facility: CLINIC | Age: 40
End: 2025-08-20
Payer: COMMERCIAL

## 2025-09-03 ENCOUNTER — PATIENT OUTREACH (OUTPATIENT)
Dept: CARE COORDINATION | Facility: CLINIC | Age: 40
End: 2025-09-03
Payer: COMMERCIAL

## (undated) RX ORDER — TRIAMCINOLONE ACETONIDE 40 MG/ML
INJECTION, SUSPENSION INTRA-ARTICULAR; INTRAMUSCULAR
Status: DISPENSED
Start: 2022-08-10

## (undated) RX ORDER — TRIAMCINOLONE ACETONIDE 40 MG/ML
INJECTION, SUSPENSION INTRA-ARTICULAR; INTRAMUSCULAR
Status: DISPENSED
Start: 2022-10-05

## (undated) RX ORDER — TRIAMCINOLONE ACETONIDE 40 MG/ML
INJECTION, SUSPENSION INTRA-ARTICULAR; INTRAMUSCULAR
Status: DISPENSED
Start: 2022-06-29